# Patient Record
Sex: MALE | Race: WHITE | Employment: UNEMPLOYED | URBAN - METROPOLITAN AREA
[De-identification: names, ages, dates, MRNs, and addresses within clinical notes are randomized per-mention and may not be internally consistent; named-entity substitution may affect disease eponyms.]

---

## 2021-10-31 ENCOUNTER — HOSPITAL ENCOUNTER (EMERGENCY)
Age: 21
Discharge: HOME | End: 2021-10-31
Attending: EMERGENCY MEDICINE
Payer: MEDICAID

## 2021-10-31 VITALS
DIASTOLIC BLOOD PRESSURE: 66 MMHG | HEART RATE: 101 BPM | TEMPERATURE: 97.1 F | HEIGHT: 67 IN | WEIGHT: 137 LBS | BODY MASS INDEX: 21.5 KG/M2 | SYSTOLIC BLOOD PRESSURE: 117 MMHG | OXYGEN SATURATION: 99 % | RESPIRATION RATE: 16 BRPM

## 2021-10-31 DIAGNOSIS — Z86.79 HISTORY OF WOLFF-PARKINSON-WHITE (WPW) SYNDROME: ICD-10-CM

## 2021-10-31 DIAGNOSIS — R00.0 RACING HEART BEAT: Primary | ICD-10-CM

## 2021-10-31 PROCEDURE — 2580000003 HC RX 258: Performed by: EMERGENCY MEDICINE

## 2021-10-31 PROCEDURE — 93005 ELECTROCARDIOGRAM TRACING: CPT | Performed by: EMERGENCY MEDICINE

## 2021-10-31 PROCEDURE — 99283 EMERGENCY DEPT VISIT LOW MDM: CPT

## 2021-10-31 RX ORDER — HEPARIN SODIUM (PORCINE) LOCK FLUSH IV SOLN 100 UNIT/ML 100 UNIT/ML
100 SOLUTION INTRAVENOUS ONCE
Status: DISCONTINUED | OUTPATIENT
Start: 2021-10-31 | End: 2021-10-31

## 2021-10-31 RX ORDER — SODIUM CHLORIDE 0.9 % (FLUSH) 0.9 %
5-40 SYRINGE (ML) INJECTION 2 TIMES DAILY
Status: DISCONTINUED | OUTPATIENT
Start: 2021-10-31 | End: 2021-10-31 | Stop reason: HOSPADM

## 2021-10-31 RX ADMIN — SODIUM CHLORIDE, PRESERVATIVE FREE 10 ML: 5 INJECTION INTRAVENOUS at 12:36

## 2021-10-31 ASSESSMENT — ENCOUNTER SYMPTOMS
WHEEZING: 0
EYE REDNESS: 0
ABDOMINAL PAIN: 0
EYE DISCHARGE: 0
BACK PAIN: 0
SHORTNESS OF BREATH: 1
SINUS PRESSURE: 0
COUGH: 0
NAUSEA: 0
DIARRHEA: 0
VOMITING: 0
SORE THROAT: 0
EYE PAIN: 0

## 2021-10-31 NOTE — ED PROVIDER NOTES
Racing heart, dyspnea, drinking [place]. Resident of [facility] last use of illicit METH 2 weeks ago; As a child 2 cardiac ablations for WPW, no recent medical care per se    The history is provided by the patient. Palpitations  Palpitations quality:  Regular  Onset quality:  Unable to specify  Timing:  Constant  Progression:  Unchanged  Chronicity:  Recurrent  Context: caffeine, illicit drugs and nicotine    Relieved by:  Nothing  Worsened by:  Caffeine  Ineffective treatments:  None tried  Associated symptoms: diaphoresis, numbness and shortness of breath    Associated symptoms: no back pain, no chest pain, no cough, no nausea, no vomiting and no weakness         Review of Systems   Constitutional: Positive for diaphoresis. Negative for chills and fever. HENT: Negative for ear pain, sinus pressure and sore throat. Eyes: Negative for pain, discharge and redness. Respiratory: Positive for shortness of breath. Negative for cough and wheezing. Cardiovascular: Positive for palpitations. Negative for chest pain. Gastrointestinal: Negative for abdominal pain, diarrhea, nausea and vomiting. Genitourinary: Negative for dysuria and frequency. Musculoskeletal: Negative for arthralgias and back pain. Skin: Negative for rash and wound. Neurological: Positive for numbness. Negative for weakness and headaches. Hematological: Negative for adenopathy. Psychiatric/Behavioral: Negative. All other systems reviewed and are negative. Physical Exam  Vitals and nursing note reviewed. Constitutional:       General: He is in acute distress. Appearance: He is well-developed. He is diaphoretic. HENT:      Head: Normocephalic and atraumatic. Right Ear: Tympanic membrane normal.      Left Ear: Tympanic membrane normal.   Eyes:      Pupils: Pupils are equal, round, and reactive to light. Cardiovascular:      Rate and Rhythm: Regular rhythm. Tachycardia present.       Heart sounds: Normal heart sounds. No murmur heard. Pulmonary:      Effort: Pulmonary effort is normal.      Breath sounds: Normal breath sounds. Abdominal:      General: Bowel sounds are normal.      Palpations: Abdomen is soft. Tenderness: There is no abdominal tenderness. There is no guarding or rebound. Musculoskeletal:      Cervical back: Normal range of motion and neck supple. Skin:     General: Skin is warm. Neurological:      Mental Status: He is alert and oriented to person, place, and time. Psychiatric:         Behavior: Behavior normal.         Thought Content: Thought content normal.         Judgment: Judgment normal.        --------------------------------------------- PAST HISTORY ---------------------------------------------  Past Medical History:  has a past medical history of Methamphetamine abuse ([facility] [address] and Jenaro-Parkinson-White syndrome. Past Surgical History:  has a past surgical history that includes ablation of dysrhythmic focus. Social History:  reports that he has quit smoking. His smoking use included e-cigarettes. He has never used smokeless tobacco. He reports previous alcohol use. He reports current drug use. Drug: Methamphetamines. Family History: family history is not on file. The patients home medications have been reviewed. Allergies: Patient has no known allergies.     -------------------------------------------------- RESULTS -------------------------------------------------  Results for orders placed or performed during the hospital encounter of 10/31/21   EKG 12 Lead   Result Value Ref Range    Ventricular Rate 109 BPM    Atrial Rate 109 BPM    P-R Interval 172 ms    QRS Duration 86 ms    Q-T Interval 326 ms    QTc Calculation (Bazett) 439 ms    P Axis 63 degrees    R Axis 91 degrees    T Axis 53 degrees     No orders to display       ------------------------- NURSING NOTES AND VITALS REVIEWED ---------------------------   The nursing notes within the ED encounter and vital signs as below have been reviewed. /73   Pulse 120   Temp 97.1 °F (36.2 °C)   Resp 16   Ht 5' 7\" (1.702 m)   Wt 137 lb (62.1 kg)   SpO2 99%   BMI 21.46 kg/m²   Oxygen Saturation Interpretation: Normal      ------------------------------------------ PROGRESS NOTES ------------------------------------------   I have spoken with the patient and discussed todays results, in addition to providing specific details for the plan of care and counseling regarding the diagnosis and prognosis. Their questions are answered at this time and they are agreeable with the plan to transfer to a higher level of ED care. Case discussed with DR Meme Mariee, the [facility] [place] diversion due to generator issue; I then discussed the case with ED Attending [facility] Dr. Jeanne Shaw who is accepting this patient by EMS for further eval and treatment.      --------------------------------- ADDITIONAL PROVIDER NOTES ---------------------------------        This patient is stable for EMS Transfer. IMPRESSION:     1. Racing heart beat    2. History of Jenaro-Parkinson-White (WPW) syndrome      Patient's Medications   New Prescriptions    No medications on file   Previous Medications    ARIPIPRAZOLE (ABILIFY PO)    Take by mouth   Modified Medications    No medications on file   Discontinued Medications    No medications on file     EKG: This EKG is signed and interpreted by me.     Rate: 110  Rhythm: Sinus  Interpretation: non-specific EKG  Comparison: no previous EKG available      Procedures     EMA Arceo DO  10/31/21 9323

## 2021-11-01 LAB
EKG ATRIAL RATE: 109 BPM
EKG P AXIS: 63 DEGREES
EKG P-R INTERVAL: 172 MS
EKG Q-T INTERVAL: 326 MS
EKG QRS DURATION: 86 MS
EKG QTC CALCULATION (BAZETT): 439 MS
EKG R AXIS: 91 DEGREES
EKG T AXIS: 53 DEGREES
EKG VENTRICULAR RATE: 109 BPM

## 2021-11-01 PROCEDURE — 93010 ELECTROCARDIOGRAM REPORT: CPT | Performed by: INTERNAL MEDICINE

## 2021-12-27 ENCOUNTER — HOSPITAL ENCOUNTER (OUTPATIENT)
Age: 21
Discharge: HOME | End: 2021-12-27
Payer: MEDICAID

## 2021-12-27 LAB
EKG ATRIAL RATE: 77 BPM
EKG P AXIS: 56 DEGREES
EKG P-R INTERVAL: 206 MS
EKG Q-T INTERVAL: 350 MS
EKG QRS DURATION: 92 MS
EKG QTC CALCULATION (BAZETT): 396 MS
EKG R AXIS: 94 DEGREES
EKG T AXIS: 70 DEGREES
EKG VENTRICULAR RATE: 77 BPM

## 2021-12-27 PROCEDURE — 93005 ELECTROCARDIOGRAM TRACING: CPT | Performed by: NURSE PRACTITIONER

## 2022-01-05 ENCOUNTER — HOSPITAL ENCOUNTER (EMERGENCY)
Age: 22
Discharge: HOME | End: 2022-01-05
Attending: EMERGENCY MEDICINE
Payer: MEDICAID

## 2022-01-05 VITALS
DIASTOLIC BLOOD PRESSURE: 76 MMHG | HEART RATE: 64 BPM | BODY MASS INDEX: 21.92 KG/M2 | WEIGHT: 148 LBS | TEMPERATURE: 97.8 F | RESPIRATION RATE: 16 BRPM | HEIGHT: 69 IN | OXYGEN SATURATION: 98 % | SYSTOLIC BLOOD PRESSURE: 117 MMHG

## 2022-01-05 DIAGNOSIS — Z20.822 SUSPECTED COVID-19 VIRUS INFECTION: Primary | ICD-10-CM

## 2022-01-05 PROCEDURE — U0005 INFEC AGEN DETEC AMPLI PROBE: HCPCS

## 2022-01-05 PROCEDURE — U0003 INFECTIOUS AGENT DETECTION BY NUCLEIC ACID (DNA OR RNA); SEVERE ACUTE RESPIRATORY SYNDROME CORONAVIRUS 2 (SARS-COV-2) (CORONAVIRUS DISEASE [COVID-19]), AMPLIFIED PROBE TECHNIQUE, MAKING USE OF HIGH THROUGHPUT TECHNOLOGIES AS DESCRIBED BY CMS-2020-01-R: HCPCS

## 2022-01-05 PROCEDURE — 99281 EMR DPT VST MAYX REQ PHY/QHP: CPT

## 2022-01-05 RX ORDER — ONDANSETRON 4 MG/1
4 TABLET, ORALLY DISINTEGRATING ORAL EVERY 8 HOURS PRN
Qty: 10 TABLET | Refills: 0 | Status: SHIPPED | OUTPATIENT
Start: 2022-01-05 | End: 2022-03-31

## 2022-01-05 RX ORDER — BROMPHENIRAMINE MALEATE, PSEUDOEPHEDRINE HYDROCHLORIDE, AND DEXTROMETHORPHAN HYDROBROMIDE 2; 30; 10 MG/5ML; MG/5ML; MG/5ML
5 SYRUP ORAL 4 TIMES DAILY PRN
Qty: 120 ML | Refills: 0 | Status: SHIPPED | OUTPATIENT
Start: 2022-01-05 | End: 2022-03-31

## 2022-01-05 RX ORDER — HYDROXYZINE PAMOATE 25 MG/1
25 CAPSULE ORAL 3 TIMES DAILY PRN
COMMUNITY

## 2022-01-05 RX ORDER — BENZTROPINE MESYLATE 1 MG/1
1 TABLET ORAL 2 TIMES DAILY
COMMUNITY

## 2022-01-05 ASSESSMENT — ENCOUNTER SYMPTOMS
BACK PAIN: 0
DIARRHEA: 1
RHINORRHEA: 1
SORE THROAT: 0
EYE DISCHARGE: 0
NAUSEA: 1
SHORTNESS OF BREATH: 0
COUGH: 0
EYE PAIN: 0
SINUS PRESSURE: 0
ABDOMINAL PAIN: 0
WHEEZING: 0
VOMITING: 0
EYE REDNESS: 0

## 2022-01-05 NOTE — ED PROVIDER NOTES
PATIENT STATES HE HAD COVID IN October 2021 OR EARLIER    The history is provided by the patient. Illness   The current episode started today. The onset was sudden. The problem is moderate. Associated symptoms include diarrhea, nausea, congestion, headaches, rhinorrhea and muscle aches. Pertinent negatives include no fever, no abdominal pain, no vomiting, no ear pain, no sore throat, no cough, no wheezing, no rash, no eye discharge, no eye pain and no eye redness. Review of Systems   Constitutional: Negative for chills and fever. HENT: Positive for congestion and rhinorrhea. Negative for ear pain, sinus pressure and sore throat. Eyes: Negative for pain, discharge and redness. Respiratory: Negative for cough, shortness of breath and wheezing. Cardiovascular: Negative for chest pain. Gastrointestinal: Positive for diarrhea and nausea. Negative for abdominal pain and vomiting. Genitourinary: Negative for dysuria and frequency. Musculoskeletal: Negative for arthralgias and back pain. Skin: Negative for rash and wound. Neurological: Positive for headaches. Negative for weakness. Hematological: Negative for adenopathy. All other systems reviewed and are negative. Physical Exam  Vitals and nursing note reviewed. Constitutional:       Appearance: He is well-developed. HENT:      Head: Normocephalic and atraumatic. Jaw: No trismus. Right Ear: Hearing and external ear normal. Tympanic membrane is retracted. Left Ear: Hearing and external ear normal. Tympanic membrane is retracted. Nose: Mucosal edema and congestion present. Right Sinus: No maxillary sinus tenderness or frontal sinus tenderness. Left Sinus: No maxillary sinus tenderness or frontal sinus tenderness. Mouth/Throat:      Pharynx: Uvula midline. No uvula swelling.    Eyes:      General: Lids are normal.      Conjunctiva/sclera: Conjunctivae normal.      Pupils: Pupils are equal, round, and reactive to light. Cardiovascular:      Rate and Rhythm: Normal rate and regular rhythm. Heart sounds: Normal heart sounds. No murmur heard. Pulmonary:      Effort: Pulmonary effort is normal. No respiratory distress. Breath sounds: Normal breath sounds. No wheezing or rales. Abdominal:      General: Bowel sounds are normal.      Palpations: Abdomen is soft. Abdomen is not rigid. Tenderness: There is no abdominal tenderness. There is no guarding or rebound. Musculoskeletal:      Cervical back: Normal range of motion and neck supple. Skin:     General: Skin is warm and dry. Findings: No abrasion or rash. Neurological:      Mental Status: He is alert and oriented to person, place, and time. GCS: GCS eye subscore is 4. GCS verbal subscore is 5. GCS motor subscore is 6. Cranial Nerves: No cranial nerve deficit. Sensory: No sensory deficit. Coordination: Coordination normal.      Gait: Gait normal.          Procedures     MDM          --------------------------------------------- PAST HISTORY ---------------------------------------------  Past Medical History:  has a past medical history of Methamphetamine abuse ([facility] [address] and Jenaro-Parkinson-White syndrome. Past Surgical History:  has a past surgical history that includes ablation of dysrhythmic focus. Social History:  reports that he has quit smoking. His smoking use included e-cigarettes. He has never used smokeless tobacco. He reports previous alcohol use. He reports previous drug use. Drug: Methamphetamines (Crystal Meth). Family History: family history is not on file. The patients home medications have been reviewed. Allergies: Patient has no known allergies. -------------------------------------------------- RESULTS -------------------------------------------------  Labs:  No results found for this visit on 01/05/22.     Radiology:  No orders to display       ------------------------- NURSING NOTES AND VITALS REVIEWED ---------------------------  Date / Time Roomed:  1/5/2022  8:11 AM  ED Bed Assignment:  01/01    The nursing notes within the ED encounter and vital signs as below have been reviewed. /76   Pulse 64   Temp 97.8 °F (36.6 °C) (Temporal)   Resp 16   Ht 5' 9\" (1.753 m)   Wt 148 lb (67.1 kg)   SpO2 98%   BMI 21.86 kg/m²   Oxygen Saturation Interpretation: Normal      ------------------------------------------ PROGRESS NOTES ------------------------------------------  I have spoken with the patient and discussed todays results, in addition to providing specific details for the plan of care and counseling regarding the diagnosis and prognosis. Their questions are answered at this time and they are agreeable with the plan. I discussed at length with them reasons for immediate return here for re evaluation. They will followup with primary care by calling their office tomorrow. PCR COVID TEST DONE AS A SENDOUT      --------------------------------- ADDITIONAL PROVIDER NOTES ---------------------------------  At this time the patient is without objective evidence of an acute process requiring hospitalization or inpatient management. They have remained hemodynamically stable throughout their entire ED visit and are stable for discharge with outpatient follow-up. The plan has been discussed in detail and they are aware of the specific conditions for emergent return, as well as the importance of follow-up. New Prescriptions    BROMPHENIRAMINE-PSEUDOEPHEDRINE-DM 2-30-10 MG/5ML SYRUP    Take 5 mLs by mouth 4 times daily as needed for Congestion or Cough    ONDANSETRON (ZOFRAN ODT) 4 MG DISINTEGRATING TABLET    Take 1 tablet by mouth every 8 hours as needed for Nausea or Vomiting       Diagnosis:  1. Suspected COVID-19 virus infection        Disposition:  Patient's disposition: Discharge to home  Patient's condition is stable.                       Jeff Bowie, MD  01/05/22 9895

## 2022-01-06 LAB — SARS-COV-2, PCR: NOT DETECTED

## 2022-03-31 ENCOUNTER — HOSPITAL ENCOUNTER (EMERGENCY)
Age: 22
Discharge: HOME | End: 2022-03-31
Attending: EMERGENCY MEDICINE
Payer: MEDICAID

## 2022-03-31 VITALS
OXYGEN SATURATION: 99 % | HEART RATE: 96 BPM | HEIGHT: 69 IN | RESPIRATION RATE: 16 BRPM | BODY MASS INDEX: 22.22 KG/M2 | WEIGHT: 150 LBS | DIASTOLIC BLOOD PRESSURE: 80 MMHG | SYSTOLIC BLOOD PRESSURE: 114 MMHG | TEMPERATURE: 97.5 F

## 2022-03-31 DIAGNOSIS — K12.0 APHTHOUS ULCER OF TONGUE: ICD-10-CM

## 2022-03-31 DIAGNOSIS — J02.9 VIRAL PHARYNGITIS: Primary | ICD-10-CM

## 2022-03-31 DIAGNOSIS — B97.89 STOMATITIS, VIRAL: ICD-10-CM

## 2022-03-31 DIAGNOSIS — K12.1 STOMATITIS, VIRAL: ICD-10-CM

## 2022-03-31 LAB — STREP GRP A PCR: NEGATIVE

## 2022-03-31 PROCEDURE — 99283 EMERGENCY DEPT VISIT LOW MDM: CPT

## 2022-03-31 PROCEDURE — 87880 STREP A ASSAY W/OPTIC: CPT

## 2022-03-31 RX ORDER — ACYCLOVIR 400 MG/1
800 TABLET ORAL
Qty: 35 TABLET | Refills: 0 | Status: SHIPPED | OUTPATIENT
Start: 2022-03-31 | End: 2022-04-07

## 2022-03-31 RX ORDER — IBUPROFEN 800 MG/1
800 TABLET ORAL EVERY 8 HOURS PRN
Qty: 21 TABLET | Refills: 0 | Status: SHIPPED | OUTPATIENT
Start: 2022-03-31 | End: 2022-04-07

## 2022-03-31 RX ORDER — IBUPROFEN 600 MG/1
600 TABLET ORAL EVERY 6 HOURS PRN
COMMUNITY
End: 2022-03-31

## 2022-03-31 RX ORDER — BUPROPION HYDROCHLORIDE 150 MG/1
150 TABLET ORAL EVERY MORNING
COMMUNITY

## 2022-03-31 RX ORDER — TRIAMCINOLONE ACETONIDE 0.1 %
PASTE (GRAM) DENTAL
Qty: 5 G | Refills: 0 | Status: SHIPPED | OUTPATIENT
Start: 2022-03-31 | End: 2022-04-07

## 2022-03-31 ASSESSMENT — PAIN DESCRIPTION - DESCRIPTORS: DESCRIPTORS: BURNING

## 2022-03-31 ASSESSMENT — PAIN DESCRIPTION - LOCATION: LOCATION: MOUTH;THROAT

## 2022-03-31 ASSESSMENT — ENCOUNTER SYMPTOMS
EYE PAIN: 0
EYE REDNESS: 0
SHORTNESS OF BREATH: 0
NAUSEA: 0
ABDOMINAL PAIN: 0
VOMITING: 0
SORE THROAT: 1
WHEEZING: 0
SINUS PRESSURE: 0
COUGH: 0
EYE DISCHARGE: 0
BACK PAIN: 0
DIARRHEA: 0

## 2022-03-31 ASSESSMENT — PAIN DESCRIPTION - ONSET: ONSET: ON-GOING

## 2022-03-31 ASSESSMENT — PAIN SCALES - GENERAL: PAINLEVEL_OUTOF10: 8

## 2022-03-31 ASSESSMENT — PAIN DESCRIPTION - PROGRESSION: CLINICAL_PROGRESSION: NOT CHANGED

## 2022-03-31 ASSESSMENT — PAIN DESCRIPTION - PAIN TYPE: TYPE: ACUTE PAIN

## 2022-03-31 ASSESSMENT — PAIN DESCRIPTION - FREQUENCY: FREQUENCY: CONTINUOUS

## 2022-03-31 NOTE — ED PROVIDER NOTES
Blisters on tongue, throat sore to swallow    The history is provided by the patient. Pharyngitis  Location:  Generalized  Severity:  Mild  Onset quality:  Gradual  Timing:  Constant  Progression:  Worsening  Chronicity:  New  Relieved by:  Nothing  Worsened by:  Eating and swallowing  Associated symptoms: adenopathy    Associated symptoms: no abdominal pain, no chest pain, no chills, no cough, no ear pain, no eye discharge, no fever, no headaches, no rash and no shortness of breath         Review of Systems   Constitutional: Negative for chills and fever. HENT: Positive for sore throat. Negative for ear pain and sinus pressure. Eyes: Negative for pain, discharge and redness. Respiratory: Negative for cough, shortness of breath and wheezing. Cardiovascular: Negative for chest pain. Gastrointestinal: Negative for abdominal pain, diarrhea, nausea and vomiting. Genitourinary: Negative for dysuria and frequency. Musculoskeletal: Negative for arthralgias and back pain. Skin: Negative for rash and wound. Neurological: Negative for weakness and headaches. Hematological: Positive for adenopathy. Psychiatric/Behavioral: Negative. All other systems reviewed and are negative. Physical Exam  Vitals and nursing note reviewed. Constitutional:       Appearance: He is well-developed. HENT:      Head: Normocephalic and atraumatic. Mouth/Throat:      Mouth: Oral lesions present. Dentition: Gingival swelling present. Tongue: Lesions present. Palate: Lesions present. Pharynx: Uvula midline. Pharyngeal swelling and posterior oropharyngeal erythema present. Tonsils: No tonsillar exudate or tonsillar abscesses. Eyes:      Pupils: Pupils are equal, round, and reactive to light. Cardiovascular:      Rate and Rhythm: Normal rate and regular rhythm. Heart sounds: Normal heart sounds. No murmur heard.       Pulmonary:      Effort: Pulmonary effort is normal. Breath sounds: Normal breath sounds. Abdominal:      General: Bowel sounds are normal.      Palpations: Abdomen is soft. Tenderness: There is no abdominal tenderness. There is no guarding or rebound. Musculoskeletal:      Cervical back: Normal range of motion and neck supple. Skin:     General: Skin is warm and dry. Neurological:      Mental Status: He is alert and oriented to person, place, and time. Psychiatric:         Behavior: Behavior normal.         Thought Content: Thought content normal.         Judgment: Judgment normal.        --------------------------------------------- PAST HISTORY ---------------------------------------------  Past Medical History:  has a past medical history of Methamphetamine abuse ([facility] [address] and Jenaro-Parkinson-White syndrome. Past Surgical History:  has a past surgical history that includes ablation of dysrhythmic focus. Social History:  reports that he has quit smoking. His smoking use included e-cigarettes. He has never used smokeless tobacco. He reports previous alcohol use. He reports previous drug use. Drug: Methamphetamines (Crystal Meth). Family History: family history is not on file. The patients home medications have been reviewed. Allergies: Benadryl [diphenhydramine]    -------------------------------------------------- RESULTS -------------------------------------------------  Results for orders placed or performed during the hospital encounter of 03/31/22   Strep Screen Group A Throat    Specimen: Throat   Result Value Ref Range    Strep Grp A PCR Negative Negative     No orders to display       ------------------------- NURSING NOTES AND VITALS REVIEWED ---------------------------   The nursing notes within the ED encounter and vital signs as below have been reviewed.    /80   Pulse 96   Temp 97.5 °F (36.4 °C) (Temporal)   Resp 16   Ht 5' 9\" (1.753 m)   Wt 150 lb (68 kg)   SpO2 99%   BMI 22.15 kg/m²   Oxygen Saturation Interpretation: Normal      ------------------------------------------ PROGRESS NOTES ------------------------------------------   I have spoken with the patient and discussed todays results, in addition to providing specific details for the plan of care and counseling regarding the diagnosis and prognosis. Their questions are answered at this time and they are agreeable with the plan.      --------------------------------- ADDITIONAL PROVIDER NOTES ---------------------------------        This patient is stable for discharge. I have shared the specific conditions for return, as well as the importance of follow-up. IMPRESSION:     1. Viral pharyngitis    2. Stomatitis, viral    3. Aphthous ulcer of tongue      Patient's Medications   New Prescriptions    ACYCLOVIR (ZOVIRAX) 400 MG TABLET    Take 2 tablets by mouth 5 times daily for 7 days    IBUPROFEN (IBU) 800 MG TABLET    Take 1 tablet by mouth every 8 hours as needed for Pain    TRIAMCINOLONE ACETONIDE (KENALOG) 0.1 % PASTE    Apply to lesions 2 times daily.    Previous Medications    ARIPIPRAZOLE (ABILIFY PO)    Take by mouth    BENZTROPINE (COGENTIN) 1 MG TABLET    Take 1 mg by mouth 2 times daily    BUPROPION (WELLBUTRIN XL) 150 MG EXTENDED RELEASE TABLET    Take 150 mg by mouth every morning    HYDROXYZINE (VISTARIL) 25 MG CAPSULE    Take 25 mg by mouth 3 times daily as needed for Itching    UNKNOWN TO PATIENT    1 capsule daily   Modified Medications    No medications on file   Discontinued Medications    BROMPHENIRAMINE-PSEUDOEPHEDRINE-DM 2-30-10 MG/5ML SYRUP    Take 5 mLs by mouth 4 times daily as needed for Congestion or Cough    IBUPROFEN (ADVIL;MOTRIN) 600 MG TABLET    Take 600 mg by mouth every 6 hours as needed for Pain    ONDANSETRON (ZOFRAN ODT) 4 MG DISINTEGRATING TABLET    Take 1 tablet by mouth every 8 hours as needed for Nausea or Vomiting         Procedures     MDM                   Fidel Grebe,   03/31/22 0243

## 2022-12-18 ENCOUNTER — APPOINTMENT (OUTPATIENT)
Dept: GENERAL RADIOLOGY | Age: 22
End: 2022-12-18
Payer: MEDICAID

## 2022-12-18 ENCOUNTER — HOSPITAL ENCOUNTER (EMERGENCY)
Age: 22
Discharge: HOME | End: 2022-12-18
Attending: STUDENT IN AN ORGANIZED HEALTH CARE EDUCATION/TRAINING PROGRAM
Payer: MEDICAID

## 2022-12-18 VITALS
DIASTOLIC BLOOD PRESSURE: 68 MMHG | OXYGEN SATURATION: 99 % | HEIGHT: 69 IN | RESPIRATION RATE: 16 BRPM | BODY MASS INDEX: 25.18 KG/M2 | HEART RATE: 98 BPM | TEMPERATURE: 97.5 F | SYSTOLIC BLOOD PRESSURE: 128 MMHG | WEIGHT: 170 LBS

## 2022-12-18 DIAGNOSIS — J06.9 ACUTE UPPER RESPIRATORY INFECTION: ICD-10-CM

## 2022-12-18 DIAGNOSIS — R05.2 SUBACUTE COUGH: Primary | ICD-10-CM

## 2022-12-18 PROCEDURE — 71046 X-RAY EXAM CHEST 2 VIEWS: CPT

## 2022-12-18 PROCEDURE — 99283 EMERGENCY DEPT VISIT LOW MDM: CPT

## 2022-12-18 RX ORDER — BUSPIRONE HYDROCHLORIDE 15 MG/1
15 TABLET ORAL 3 TIMES DAILY
COMMUNITY

## 2022-12-18 RX ORDER — CLONIDINE HYDROCHLORIDE 0.1 MG/1
0.1 TABLET ORAL DAILY
COMMUNITY

## 2022-12-18 RX ORDER — OLANZAPINE 10 MG/1
10 TABLET ORAL NIGHTLY
COMMUNITY

## 2022-12-18 RX ORDER — BENZONATATE 100 MG/1
100 CAPSULE ORAL 3 TIMES DAILY PRN
Qty: 30 CAPSULE | Refills: 0 | Status: SHIPPED | OUTPATIENT
Start: 2022-12-18 | End: 2022-12-25

## 2022-12-18 ASSESSMENT — ENCOUNTER SYMPTOMS
ANAL BLEEDING: 0
DIARRHEA: 0
VOMITING: 0
RHINORRHEA: 0
ABDOMINAL DISTENTION: 0
CHEST TIGHTNESS: 0
SINUS PAIN: 0
CONSTIPATION: 0
EYE DISCHARGE: 0
BACK PAIN: 0
COUGH: 1
SHORTNESS OF BREATH: 0
SINUS PRESSURE: 0
NAUSEA: 0
ABDOMINAL PAIN: 0

## 2022-12-18 ASSESSMENT — PAIN DESCRIPTION - LOCATION: LOCATION: OTHER (COMMENT)

## 2022-12-18 ASSESSMENT — PAIN - FUNCTIONAL ASSESSMENT: PAIN_FUNCTIONAL_ASSESSMENT: 0-10

## 2022-12-18 ASSESSMENT — PAIN DESCRIPTION - DESCRIPTORS: DESCRIPTORS: SHARP

## 2022-12-18 ASSESSMENT — PAIN DESCRIPTION - PAIN TYPE: TYPE: ACUTE PAIN

## 2022-12-18 ASSESSMENT — PAIN DESCRIPTION - ONSET: ONSET: ON-GOING

## 2022-12-18 ASSESSMENT — PAIN DESCRIPTION - ORIENTATION: ORIENTATION: LEFT

## 2022-12-18 ASSESSMENT — PAIN SCALES - GENERAL: PAINLEVEL_OUTOF10: 4

## 2022-12-18 ASSESSMENT — PAIN DESCRIPTION - FREQUENCY: FREQUENCY: INTERMITTENT

## 2022-12-18 NOTE — ED PROVIDER NOTES
HPI     Patient is a 25 y.o. male presents with a chief complaint of cough  This has been occurring for a few weeks. Patient states it was gradual in onset. Presents with cough. Patient states that he has some sharp chest pain on the left side. Denies any fevers or chills. Patient denies any changes in urinary or bowel habits. Patient denies any use of IV drugs. Patient denies any recent surgery, recent travel, history of blood clots in the past.  Patient states that the only reason why he came in is because he looked up online that if he has \"black speckles\" and has cough he needs to come in. Patient is also having some mucus drainage. Review of Systems   Constitutional:  Negative for activity change, appetite change, fatigue and fever. HENT:  Negative for congestion, rhinorrhea, sinus pressure and sinus pain. Eyes:  Negative for discharge. Respiratory:  Positive for cough. Negative for chest tightness and shortness of breath. Cardiovascular:  Positive for chest pain. Negative for palpitations. Gastrointestinal:  Negative for abdominal distention, abdominal pain, anal bleeding, constipation, diarrhea, nausea and vomiting. Endocrine: Negative for polydipsia and polyuria. Genitourinary:  Negative for decreased urine volume, difficulty urinating, enuresis, flank pain, frequency and urgency. Musculoskeletal:  Negative for arthralgias, back pain and neck stiffness. Skin:  Negative for rash and wound. Neurological:  Negative for dizziness, weakness, light-headedness and headaches. Psychiatric/Behavioral:  Negative for agitation, behavioral problems and confusion. Physical Exam  Vitals and nursing note reviewed. Constitutional:       Appearance: He is well-developed. HENT:      Head: Normocephalic and atraumatic. Eyes:      Conjunctiva/sclera: Conjunctivae normal.   Cardiovascular:      Rate and Rhythm: Normal rate and regular rhythm.       Heart sounds: Normal heart sounds. No murmur heard. Comments: Reproducible chest pain. Pulmonary:      Effort: Pulmonary effort is normal. No respiratory distress. Breath sounds: Normal breath sounds. No wheezing or rales. Abdominal:      General: Bowel sounds are normal.      Palpations: Abdomen is soft. Tenderness: There is no abdominal tenderness. There is no guarding or rebound. Musculoskeletal:         General: No tenderness or deformity. Cervical back: Normal range of motion and neck supple. Skin:     General: Skin is warm and dry. Neurological:      Mental Status: He is alert and oriented to person, place, and time. Cranial Nerves: No cranial nerve deficit. Coordination: Coordination normal.        Procedures     MDM           Patient is a 25 y.o. male presenting with cough. Patient clinically appears well. Patient is PERC negative. Patient's chest pain is not consistent with ACS. Patient denies any IV drug use. Patient appears nontoxic. States that the symptoms have been going on for 2 weeks. Consistent with bronchitis. Patient does smoke. Patient had a chest x-ray shows no acute findings  Patient is PERC negative. No heart murmur. Patient be discharged home. Patient was given return precautions. Labs were interpreted by me. Patient will follow up with their primary care provider. Patient is agreeable to this plan. Patient has remained stable throughout their stay in the ED. This note was done using dictation software and there may be some grammatical errors associated with this. Rebecca Bowie MD              --------------------------------------------- PAST HISTORY ---------------------------------------------  Past Medical History:  has a past medical history of Methamphetamine abuse ([facility] [address] and Jenaro-Parkinson-White syndrome. Past Surgical History:  has a past surgical history that includes ablation of dysrhythmic focus.     Social History:  reports that he has quit smoking. His smoking use included e-cigarettes. He has never used smokeless tobacco. He reports that he does not currently use alcohol. He reports that he does not currently use drugs after having used the following drugs: Methamphetamines (Crystal Meth). Family History: family history is not on file. The patients home medications have been reviewed. Allergies: Benadryl [diphenhydramine]    -------------------------------------------------- RESULTS -------------------------------------------------  Labs:  No results found for this visit on 12/18/22. Radiology:  XR CHEST (2 VW)   Final Result   Hyperinflated lungs. Otherwise, no acute cardiopulmonary disease.             ------------------------- NURSING NOTES AND VITALS REVIEWED ---------------------------  Date / Time Roomed:  12/18/2022  4:10 PM  ED Bed Assignment:  03/03    The nursing notes within the ED encounter and vital signs as below have been reviewed. /68   Pulse 98   Temp 97.5 °F (36.4 °C) (Temporal)   Resp 16   Ht 5' 9\" (1.753 m)   Wt 170 lb (77.1 kg)   SpO2 99%   BMI 25.10 kg/m²   Oxygen Saturation Interpretation: Normal      ------------------------------------------ PROGRESS NOTES ------------------------------------------  5:10 PM EST  I have spoken with the patient and family if present and discussed todays results, in addition to providing specific details for the plan of care and counseling regarding the diagnosis and prognosis. Their questions are answered at this time and they are agreeable with the plan. I discussed at length with them reasons for immediate return here for re evaluation. They will followup with their primary care provider by calling their office as soon as possible.      --------------------------------- ADDITIONAL PROVIDER NOTES ---------------------------------  At this time the patient is without objective evidence of an acute process requiring hospitalization or inpatient management.   They have remained hemodynamically stable throughout their entire ED visit and are stable for discharge with outpatient follow-up. The plan has been discussed in detail and they are aware of the specific conditions for emergent return, as well as the importance of follow-up. New Prescriptions    BENZONATATE (TESSALON) 100 MG CAPSULE    Take 1 capsule by mouth 3 times daily as needed for Cough       Diagnosis:  1. Subacute cough    2. Acute upper respiratory infection        Disposition:  Patient's disposition: Discharge to home  Patient's condition is stable.        Clara Sanabria MD  Resident  12/18/22 3450

## 2024-07-04 ENCOUNTER — HOSPITAL ENCOUNTER (INPATIENT)
Age: 24
LOS: 4 days | Discharge: HOME | DRG: 753 | End: 2024-07-09
Attending: EMERGENCY MEDICINE | Admitting: PSYCHIATRY & NEUROLOGY
Payer: MEDICAID

## 2024-07-04 DIAGNOSIS — R45.851 SUICIDAL IDEATION: Primary | ICD-10-CM

## 2024-07-04 LAB
ALBUMIN SERPL-MCNC: 4.4 G/DL (ref 3.5–5.2)
ALP SERPL-CCNC: 101 U/L (ref 40–129)
ALT SERPL-CCNC: 32 U/L (ref 0–40)
AMPHET UR QL SCN: NEGATIVE
ANION GAP SERPL CALCULATED.3IONS-SCNC: 14 MMOL/L (ref 7–16)
APAP SERPL-MCNC: <5 UG/ML (ref 10–30)
AST SERPL-CCNC: 23 U/L (ref 0–39)
BARBITURATES UR QL SCN: NEGATIVE
BASOPHILS # BLD: 0.06 K/UL (ref 0–0.2)
BASOPHILS NFR BLD: 1 % (ref 0–2)
BENZODIAZ UR QL: NEGATIVE
BILIRUB SERPL-MCNC: 0.3 MG/DL (ref 0–1.2)
BUN SERPL-MCNC: 14 MG/DL (ref 6–20)
BUPRENORPHINE UR QL: NEGATIVE
CALCIUM SERPL-MCNC: 9.4 MG/DL (ref 8.6–10.2)
CANNABINOIDS UR QL SCN: NEGATIVE
CHLORIDE SERPL-SCNC: 104 MMOL/L (ref 98–107)
CO2 SERPL-SCNC: 22 MMOL/L (ref 22–29)
COCAINE UR QL SCN: NEGATIVE
CREAT SERPL-MCNC: 1.3 MG/DL (ref 0.7–1.2)
EOSINOPHIL # BLD: 0.15 K/UL (ref 0.05–0.5)
EOSINOPHILS RELATIVE PERCENT: 2 % (ref 0–6)
ERYTHROCYTE [DISTWIDTH] IN BLOOD BY AUTOMATED COUNT: 12.9 % (ref 11.5–15)
ETHANOLAMINE SERPL-MCNC: <10 MG/DL (ref 0–0.08)
FENTANYL UR QL: NEGATIVE
GFR, ESTIMATED: 78 ML/MIN/1.73M2
GLUCOSE SERPL-MCNC: 88 MG/DL (ref 74–99)
HCT VFR BLD AUTO: 46.1 % (ref 37–54)
HGB BLD-MCNC: 16.2 G/DL (ref 12.5–16.5)
IMM GRANULOCYTES # BLD AUTO: 0.03 K/UL (ref 0–0.58)
IMM GRANULOCYTES NFR BLD: 0 % (ref 0–5)
LYMPHOCYTES NFR BLD: 3.06 K/UL (ref 1.5–4)
LYMPHOCYTES RELATIVE PERCENT: 31 % (ref 20–42)
MCH RBC QN AUTO: 28.3 PG (ref 26–35)
MCHC RBC AUTO-ENTMCNC: 35.1 G/DL (ref 32–34.5)
MCV RBC AUTO: 80.5 FL (ref 80–99.9)
METHADONE UR QL: NEGATIVE
MONOCYTES NFR BLD: 1.17 K/UL (ref 0.1–0.95)
MONOCYTES NFR BLD: 12 % (ref 2–12)
NEUTROPHILS NFR BLD: 55 % (ref 43–80)
NEUTS SEG NFR BLD: 5.39 K/UL (ref 1.8–7.3)
OPIATES UR QL SCN: NEGATIVE
OXYCODONE UR QL SCN: NEGATIVE
PCP UR QL SCN: NEGATIVE
PLATELET # BLD AUTO: 384 K/UL (ref 130–450)
PMV BLD AUTO: 9.3 FL (ref 7–12)
POTASSIUM SERPL-SCNC: 4.1 MMOL/L (ref 3.5–5)
PROT SERPL-MCNC: 7.5 G/DL (ref 6.4–8.3)
RBC # BLD AUTO: 5.73 M/UL (ref 3.8–5.8)
SALICYLATES SERPL-MCNC: <0.3 MG/DL (ref 0–30)
SODIUM SERPL-SCNC: 140 MMOL/L (ref 132–146)
TEST INFORMATION: NORMAL
TOXIC TRICYCLIC SC,BLOOD: NEGATIVE
WBC OTHER # BLD: 9.9 K/UL (ref 4.5–11.5)

## 2024-07-04 PROCEDURE — 80143 DRUG ASSAY ACETAMINOPHEN: CPT

## 2024-07-04 PROCEDURE — G0480 DRUG TEST DEF 1-7 CLASSES: HCPCS

## 2024-07-04 PROCEDURE — 80179 DRUG ASSAY SALICYLATE: CPT

## 2024-07-04 PROCEDURE — 99285 EMERGENCY DEPT VISIT HI MDM: CPT

## 2024-07-04 PROCEDURE — 93005 ELECTROCARDIOGRAM TRACING: CPT | Performed by: EMERGENCY MEDICINE

## 2024-07-04 PROCEDURE — 80053 COMPREHEN METABOLIC PANEL: CPT

## 2024-07-04 PROCEDURE — 85025 COMPLETE CBC W/AUTO DIFF WBC: CPT

## 2024-07-04 PROCEDURE — 6370000000 HC RX 637 (ALT 250 FOR IP): Performed by: EMERGENCY MEDICINE

## 2024-07-04 PROCEDURE — 80307 DRUG TEST PRSMV CHEM ANLYZR: CPT

## 2024-07-04 RX ORDER — GABAPENTIN 300 MG/1
300 CAPSULE ORAL ONCE
Status: COMPLETED | OUTPATIENT
Start: 2024-07-04 | End: 2024-07-04

## 2024-07-04 RX ORDER — NICOTINE 21 MG/24HR
1 PATCH, TRANSDERMAL 24 HOURS TRANSDERMAL ONCE
Status: DISCONTINUED | OUTPATIENT
Start: 2024-07-04 | End: 2024-07-05

## 2024-07-04 RX ORDER — MECOBALAMIN 5000 MCG
5 TABLET,DISINTEGRATING ORAL NIGHTLY PRN
Status: DISCONTINUED | OUTPATIENT
Start: 2024-07-04 | End: 2024-07-09 | Stop reason: HOSPADM

## 2024-07-04 RX ORDER — GABAPENTIN 600 MG/1
300 TABLET ORAL ONCE
Status: DISCONTINUED | OUTPATIENT
Start: 2024-07-04 | End: 2024-07-04 | Stop reason: CLARIF

## 2024-07-04 RX ORDER — UREA 10 %
5 LOTION (ML) TOPICAL NIGHTLY PRN
Status: DISCONTINUED | OUTPATIENT
Start: 2024-07-04 | End: 2024-07-04 | Stop reason: CLARIF

## 2024-07-04 RX ORDER — CLONIDINE HYDROCHLORIDE 0.1 MG/1
0.1 TABLET ORAL ONCE
Status: COMPLETED | OUTPATIENT
Start: 2024-07-04 | End: 2024-07-04

## 2024-07-04 RX ADMIN — CLONIDINE HYDROCHLORIDE 0.1 MG: 0.1 TABLET ORAL at 20:36

## 2024-07-04 RX ADMIN — GABAPENTIN 300 MG: 300 CAPSULE ORAL at 20:54

## 2024-07-04 RX ADMIN — Medication 5 MG: at 20:54

## 2024-07-04 ASSESSMENT — PAIN - FUNCTIONAL ASSESSMENT: PAIN_FUNCTIONAL_ASSESSMENT: NONE - DENIES PAIN

## 2024-07-04 ASSESSMENT — LIFESTYLE VARIABLES
HOW OFTEN DO YOU HAVE A DRINK CONTAINING ALCOHOL: NEVER
HOW MANY STANDARD DRINKS CONTAINING ALCOHOL DO YOU HAVE ON A TYPICAL DAY: PATIENT DOES NOT DRINK

## 2024-07-05 PROBLEM — F31.64 BIPOLAR AFFECTIVE DISORDER, MIXED, SEVERE, WITH PSYCHOTIC BEHAVIOR (HCC): Status: ACTIVE | Noted: 2024-07-05

## 2024-07-05 PROBLEM — F31.9 BIPOLAR 1 DISORDER (HCC): Status: ACTIVE | Noted: 2024-07-05

## 2024-07-05 PROBLEM — F60.89 CLUSTER B PERSONALITY DISORDER (HCC): Status: ACTIVE | Noted: 2024-07-05

## 2024-07-05 PROBLEM — F31.9 BIPOLAR 1 DISORDER (HCC): Status: RESOLVED | Noted: 2024-07-05 | Resolved: 2024-07-05

## 2024-07-05 LAB
EKG ATRIAL RATE: 68 BPM
EKG P AXIS: 34 DEGREES
EKG P-R INTERVAL: 200 MS
EKG Q-T INTERVAL: 378 MS
EKG QRS DURATION: 94 MS
EKG QTC CALCULATION (BAZETT): 401 MS
EKG R AXIS: 85 DEGREES
EKG T AXIS: 68 DEGREES
EKG VENTRICULAR RATE: 68 BPM

## 2024-07-05 PROCEDURE — 93010 ELECTROCARDIOGRAM REPORT: CPT | Performed by: INTERNAL MEDICINE

## 2024-07-05 PROCEDURE — 90792 PSYCH DIAG EVAL W/MED SRVCS: CPT | Performed by: NURSE PRACTITIONER

## 2024-07-05 PROCEDURE — 6370000000 HC RX 637 (ALT 250 FOR IP): Performed by: NURSE PRACTITIONER

## 2024-07-05 PROCEDURE — 6370000000 HC RX 637 (ALT 250 FOR IP): Performed by: EMERGENCY MEDICINE

## 2024-07-05 PROCEDURE — 1240000000 HC EMOTIONAL WELLNESS R&B

## 2024-07-05 PROCEDURE — 6370000000 HC RX 637 (ALT 250 FOR IP): Performed by: PSYCHIATRY & NEUROLOGY

## 2024-07-05 RX ORDER — LANOLIN ALCOHOL/MO/W.PET/CERES
5 CREAM (GRAM) TOPICAL NIGHTLY PRN
COMMUNITY

## 2024-07-05 RX ORDER — HYDROXYZINE PAMOATE 50 MG/1
50 CAPSULE ORAL ONCE
Status: COMPLETED | OUTPATIENT
Start: 2024-07-05 | End: 2024-07-05

## 2024-07-05 RX ORDER — NICOTINE 21 MG/24HR
1 PATCH, TRANSDERMAL 24 HOURS TRANSDERMAL DAILY
Status: DISCONTINUED | OUTPATIENT
Start: 2024-07-05 | End: 2024-07-09 | Stop reason: HOSPADM

## 2024-07-05 RX ORDER — HALOPERIDOL 5 MG/ML
5 INJECTION INTRAMUSCULAR EVERY 6 HOURS PRN
Status: DISCONTINUED | OUTPATIENT
Start: 2024-07-05 | End: 2024-07-09 | Stop reason: HOSPADM

## 2024-07-05 RX ORDER — BUSPIRONE HYDROCHLORIDE 5 MG/1
5 TABLET ORAL 2 TIMES DAILY
Status: ON HOLD | COMMUNITY
End: 2024-07-09 | Stop reason: HOSPADM

## 2024-07-05 RX ORDER — ATOMOXETINE 60 MG/1
100 CAPSULE ORAL DAILY
Status: ON HOLD | COMMUNITY
End: 2024-07-09 | Stop reason: HOSPADM

## 2024-07-05 RX ORDER — CLONIDINE HYDROCHLORIDE 0.1 MG/1
0.1 TABLET ORAL DAILY
Status: DISCONTINUED | OUTPATIENT
Start: 2024-07-05 | End: 2024-07-09 | Stop reason: HOSPADM

## 2024-07-05 RX ORDER — GABAPENTIN 300 MG/1
300 CAPSULE ORAL 3 TIMES DAILY
COMMUNITY

## 2024-07-05 RX ORDER — LANOLIN ALCOHOL/MO/W.PET/CERES
3 CREAM (GRAM) TOPICAL NIGHTLY PRN
Status: DISCONTINUED | OUTPATIENT
Start: 2024-07-05 | End: 2024-07-09 | Stop reason: HOSPADM

## 2024-07-05 RX ORDER — GUANFACINE 1 MG/1
1 TABLET, EXTENDED RELEASE ORAL NIGHTLY
Status: ON HOLD | COMMUNITY
End: 2024-07-09 | Stop reason: HOSPADM

## 2024-07-05 RX ORDER — MAGNESIUM HYDROXIDE/ALUMINUM HYDROXICE/SIMETHICONE 120; 1200; 1200 MG/30ML; MG/30ML; MG/30ML
30 SUSPENSION ORAL PRN
Status: DISCONTINUED | OUTPATIENT
Start: 2024-07-05 | End: 2024-07-09 | Stop reason: HOSPADM

## 2024-07-05 RX ORDER — HYDROXYZINE PAMOATE 25 MG/1
50 CAPSULE ORAL 3 TIMES DAILY PRN
Status: DISCONTINUED | OUTPATIENT
Start: 2024-07-05 | End: 2024-07-09 | Stop reason: HOSPADM

## 2024-07-05 RX ORDER — OMEPRAZOLE 20 MG/1
40 CAPSULE, DELAYED RELEASE ORAL DAILY
COMMUNITY

## 2024-07-05 RX ORDER — PANTOPRAZOLE SODIUM 40 MG/1
40 TABLET, DELAYED RELEASE ORAL
Status: DISCONTINUED | OUTPATIENT
Start: 2024-07-06 | End: 2024-07-09 | Stop reason: HOSPADM

## 2024-07-05 RX ORDER — HALOPERIDOL 5 MG/1
5 TABLET ORAL EVERY 6 HOURS PRN
Status: DISCONTINUED | OUTPATIENT
Start: 2024-07-05 | End: 2024-07-09 | Stop reason: HOSPADM

## 2024-07-05 RX ORDER — NICOTINE 21 MG/24HR
1 PATCH, TRANSDERMAL 24 HOURS TRANSDERMAL DAILY
Status: DISCONTINUED | OUTPATIENT
Start: 2024-07-05 | End: 2024-07-05

## 2024-07-05 RX ORDER — GABAPENTIN 300 MG/1
300 CAPSULE ORAL 3 TIMES DAILY
Status: DISCONTINUED | OUTPATIENT
Start: 2024-07-05 | End: 2024-07-09 | Stop reason: HOSPADM

## 2024-07-05 RX ORDER — POLYETHYLENE GLYCOL 3350 17 G
2 POWDER IN PACKET (EA) ORAL
Status: DISCONTINUED | OUTPATIENT
Start: 2024-07-05 | End: 2024-07-05

## 2024-07-05 RX ORDER — ACETAMINOPHEN 325 MG/1
650 TABLET ORAL EVERY 6 HOURS PRN
Status: DISCONTINUED | OUTPATIENT
Start: 2024-07-05 | End: 2024-07-09 | Stop reason: HOSPADM

## 2024-07-05 RX ADMIN — CLONIDINE HYDROCHLORIDE 0.1 MG: 0.1 TABLET ORAL at 14:40

## 2024-07-05 RX ADMIN — HYDROXYZINE PAMOATE 50 MG: 50 CAPSULE ORAL at 01:11

## 2024-07-05 RX ADMIN — Medication 3 MG: at 21:24

## 2024-07-05 RX ADMIN — CARIPRAZINE 3 MG: 1.5 CAPSULE, GELATIN COATED ORAL at 16:44

## 2024-07-05 RX ADMIN — NICOTINE POLACRILEX 2 MG: 2 LOZENGE ORAL at 12:20

## 2024-07-05 RX ADMIN — GABAPENTIN 300 MG: 300 CAPSULE ORAL at 14:40

## 2024-07-05 RX ADMIN — GABAPENTIN 300 MG: 300 CAPSULE ORAL at 21:24

## 2024-07-05 ASSESSMENT — SLEEP AND FATIGUE QUESTIONNAIRES
DO YOU HAVE DIFFICULTY SLEEPING: YES
DO YOU USE A SLEEP AID: NO
AVERAGE NUMBER OF SLEEP HOURS: 8
SLEEP PATTERN: DIFFICULTY FALLING ASLEEP
DO YOU HAVE DIFFICULTY SLEEPING: YES
SLEEP PATTERN: DIFFICULTY FALLING ASLEEP
DO YOU USE A SLEEP AID: NO
AVERAGE NUMBER OF SLEEP HOURS: 8

## 2024-07-05 ASSESSMENT — PATIENT HEALTH QUESTIONNAIRE - PHQ9
1. LITTLE INTEREST OR PLEASURE IN DOING THINGS: NOT AT ALL
SUM OF ALL RESPONSES TO PHQ QUESTIONS 1-9: 2
SUM OF ALL RESPONSES TO PHQ QUESTIONS 1-9: 2
2. FEELING DOWN, DEPRESSED OR HOPELESS: MORE THAN HALF THE DAYS
SUM OF ALL RESPONSES TO PHQ9 QUESTIONS 1 & 2: 2
SUM OF ALL RESPONSES TO PHQ QUESTIONS 1-9: 2
SUM OF ALL RESPONSES TO PHQ QUESTIONS 1-9: 2

## 2024-07-05 ASSESSMENT — LIFESTYLE VARIABLES
HOW MANY STANDARD DRINKS CONTAINING ALCOHOL DO YOU HAVE ON A TYPICAL DAY: PATIENT DOES NOT DRINK
HOW OFTEN DO YOU HAVE A DRINK CONTAINING ALCOHOL: NEVER

## 2024-07-05 NOTE — ED NOTES
Behavioral Health Crisis Assessment      Chief Complaint: Patient concerned about worsening SI.    Mental Status Exam:Patient alert/oriented X3. Patient with flat affect, poor eye contact. Patient calm and cooperative with no sign of agitation. Patient speech clear, thought process organized, lucid and linear.   When asked about hallucinations, patient noted \"I see things in the corner of my eye.\" When asked what, \"random things.\" Patient denied having auditory hallucinations. Patient reported worsening SI over the last two months. Patient denied HI.    Legal Status:  [] Voluntary:  [x] Involuntary, Issued by:    Gender:  [x] Male [] Female [] Transgender  [] Other    Sexual Orientation:  [x] Heterosexual [] Homosexual [] Bisexual [] Other    Brief Clinical Summary:    Patient is a 24 year old male who has lived in sober living for a year, chart notes patient was dropped off by family. Per ED doctor note, patient reported worsening SI, a lot going on in his life. SW met with patient for assessment. SW asked patient what was going on in his life, noted to be \"a lot.\" Patient vague, responded that he is having \"a lot of mental problems.\" Patient admitted to having SI \"my whole life\" but it has worsened in the last two months. Patient reported SI has been causing sleep disruption. Patient denied current plan but stated he does not trust that he can be safe without intervention at this time. Patient noted having a history of self-injurious behavior, last cut \"3 years ago\" which patient noted was a suicide attempt. Patient stated he had another attempt at age 12, \"I put a cord around my neck.\" Patient denied having HI or history of aggression. Patient denied having access to firearms/weapons to SW but RN informed SW that patient talked about buying a gun and stated he had access to guns where he lived before sober living. Patient stated he is med compliant, active with [facility]. Patient initially denied being

## 2024-07-05 NOTE — H&P
Department of Psychiatry  History and Physical - Adult       Patient personally seen and examined by me and mental status exam performed.  I agree the below assessment by the medical student.  Psychomotor evaluation revealed no agitation retardation any abnormal movements.  His eye contact is fair his speech is normal rate rhythm and tone.  His mood is \"I feel okay.\"  Affect is mood incongruent flat blunted irritable .  His thought process is linear without flight of ideas loose associations.  Thought contents devoid of any auditory visual hallucinations delusions or any other perceptual abnormalities. He denies suicidal homicidal ideations intent or plan.  He is able to repeat words and phrases, his vocabulary is intact he has knowledge of current events and past events recent remote memory are intact   impulse control is adequate cognitive function peers to be his baseline his insight judgment is fair he is alert oriented time place and person        CHIEF COMPLAINT: \"I had suicidal thoughts my whole life\"    Patient was seen after discussing with the treatment team and reviewing the chart    CIRCUMSTANCES OF ADMISSION: Best Taylor is a 24 year old male with a past psychiatric history of anxiety, bipolar disorder, ADHD, past meth addiction, who presented to the ED with suicidal ideations. Patient was placed on an involuntary hold by ED physician. Precipitating factors include life stressors. Onset of acute symptoms, worsening for 2 weeks.     HISTORY OF PRESENT ILLNESS:      The patient is a 24 y.o. male who does not work, completed 10th grade, lives in Sober Living for a previous meth addiction, is single and has good social support, with significant past psychiatric history of anxiety, bipolar disorder, ADHD, past meth addiction. Patient presented with family to the ED for feeling suicidal. He was placed on an involuntary hold by ED physician. Urine drug screen was negative, Ethanol level was <10. QTC

## 2024-07-05 NOTE — DISCHARGE INSTRUCTIONS
Follow up for Tobacco Cessation at:    [facility]                                 Date:  Friday 7/12 at 10am              [address]    [place] [address]   (Inside [facility]    take B elevators to 7th floor)   Phone: [phone]   Fax: [phone]

## 2024-07-05 NOTE — PLAN OF CARE
Problem: Self Harm/Suicidality  Goal: Will have no self-injury during hospital stay  Description: INTERVENTIONS:  1.  Ensure constant observer at bedside with Q15M safety checks  2.  Maintain a safe environment  3.  Secure patient belongings  4.  Ensure family/visitors adhere to safety recommendations  5.  Ensure safety tray has been added to patient's diet order  6.  Every shift and PRN: Re-assess suicidal risk via Frequent Screener  Outcome: Progressing     Problem: Depression  Goal: Will be euthymic at discharge  Description: INTERVENTIONS:  1. Administer medication as ordered  2. Provide emotional support via 1:1 interaction with staff  3. Encourage involvement in milieu/groups/activities  4. Monitor for social isolation  Outcome: Progressing     Problem: Behavior  Goal: Pt/Family maintain appropriate behavior and adhere to behavioral management agreement, if implemented  Description: INTERVENTIONS:  1. Assess patient/family's coping skills and  non-compliant behavior (including use of illegal substances)  2. Notify security of behavior or suspected illegal substances which indicate the need for search of the family and/or belongings  3. Encourage verbalization of thoughts and concerns in a socially appropriate manner  4. Utilize positive, consistent limit setting strategies supporting safety of patient, staff and others  5. Encourage participation in the decision making process about the behavioral management agreement  6. If a visitor's behavior poses a threat to safety call refer to organization policy.  7. Initiate consult with , Psychosocial CNS, Spiritual Care as appropriate  Outcome: Progressing     Problem: Anxiety  Goal: Will report anxiety at manageable levels  Description: INTERVENTIONS:  1. Administer medication as ordered  2. Teach and rehearse alternative coping skills  3. Provide emotional support with 1:1 interaction with staff  Outcome: Progressing     Problem: Sleep

## 2024-07-05 NOTE — PLAN OF CARE
Denies suicidal ideations or thoughts of self harm.  Denies homicidal ideations or thoughts to hurt others.  Denies auditory and visual hallucinations.  Poor eye contact this AM, as day has progressed, patient noted to be out of his room and eye contact is good, flat affect.  Refused on unit groups today.  Medication compliant.  Patient was educated that he would not be prescribed his ADHD medication and verbalized understanding.  Up for meals.

## 2024-07-05 NOTE — ED PROVIDER NOTES
HPI:  7/4/24, Time: 10:06 PM EDT         Best Taylor is a 24 y.o. male presenting to the ED for suicidal ideation.  Patient states he has been suicidal for a while.  He does not have a plan.  He states getting worse.  Does have a psychiatrist.  He says is a lot going on his life.  No attempt.  Told his family and brought him in for evaluation.  No hallucinations.  No homicidal nation.  No other symptoms or complaints.    Review of Systems:   A complete review of systems was performed and pertinent positives and negatives are stated within HPI, all other systems reviewed and are negative.          --------------------------------------------- PAST HISTORY ---------------------------------------------  Past Medical History:  has a past medical history of Methamphetamine abuse (HCC) and Jenaro-Parkinson-White syndrome.    Past Surgical History:  has a past surgical history that includes ablation of dysrhythmic focus.    Social History:  reports that he has quit smoking. His smoking use included e-cigarettes. He has never used smokeless tobacco. He reports that he does not currently use alcohol. He reports that he does not currently use drugs after having used the following drugs: Methamphetamines (Crystal Meth).    Family History: family history is not on file.     The patient’s home medications have been reviewed.    Allergies: Benadryl [diphenhydramine]    -------------------------------------------------- RESULTS -------------------------------------------------  All laboratory and radiology results have been personally reviewed by myself   LABS:  Results for orders placed or performed during the hospital encounter of 07/04/24   CBC with Auto Differential   Result Value Ref Range    WBC 9.9 4.5 - 11.5 k/uL    RBC 5.73 3.80 - 5.80 m/uL    Hemoglobin 16.2 12.5 - 16.5 g/dL    Hematocrit 46.1 37.0 - 54.0 %    MCV 80.5 80.0 - 99.9 fL    MCH 28.3 26.0 - 35.0 pg    MCHC 35.1 (H) 32.0 - 34.5 g/dL    RDW 12.9 11.5

## 2024-07-06 LAB
CHOLEST SERPL-MCNC: 157 MG/DL
HBA1C MFR BLD: 5.6 % (ref 4–5.6)
HDLC SERPL-MCNC: 43 MG/DL
LDLC SERPL CALC-MCNC: 98 MG/DL
TRIGL SERPL-MCNC: 79 MG/DL
VLDLC SERPL CALC-MCNC: 16 MG/DL

## 2024-07-06 PROCEDURE — 1240000000 HC EMOTIONAL WELLNESS R&B

## 2024-07-06 PROCEDURE — 6370000000 HC RX 637 (ALT 250 FOR IP): Performed by: NURSE PRACTITIONER

## 2024-07-06 PROCEDURE — 83036 HEMOGLOBIN GLYCOSYLATED A1C: CPT

## 2024-07-06 PROCEDURE — 36415 COLL VENOUS BLD VENIPUNCTURE: CPT

## 2024-07-06 PROCEDURE — 99232 SBSQ HOSP IP/OBS MODERATE 35: CPT | Performed by: NURSE PRACTITIONER

## 2024-07-06 PROCEDURE — 80061 LIPID PANEL: CPT

## 2024-07-06 PROCEDURE — 6370000000 HC RX 637 (ALT 250 FOR IP): Performed by: PSYCHIATRY & NEUROLOGY

## 2024-07-06 RX ADMIN — CARIPRAZINE 3 MG: 1.5 CAPSULE, GELATIN COATED ORAL at 09:38

## 2024-07-06 RX ADMIN — PANTOPRAZOLE SODIUM 40 MG: 40 TABLET, DELAYED RELEASE ORAL at 06:58

## 2024-07-06 RX ADMIN — GABAPENTIN 300 MG: 300 CAPSULE ORAL at 09:38

## 2024-07-06 RX ADMIN — CLONIDINE HYDROCHLORIDE 0.1 MG: 0.1 TABLET ORAL at 09:38

## 2024-07-06 RX ADMIN — GABAPENTIN 300 MG: 300 CAPSULE ORAL at 21:00

## 2024-07-06 RX ADMIN — Medication 3 MG: at 21:00

## 2024-07-06 RX ADMIN — GABAPENTIN 300 MG: 300 CAPSULE ORAL at 14:20

## 2024-07-06 ASSESSMENT — PAIN SCALES - GENERAL
PAINLEVEL_OUTOF10: 0
PAINLEVEL_OUTOF10: 0

## 2024-07-06 NOTE — GROUP NOTE
Group Therapy Note    Date: 7/6/2024    Group Start Time: 0945  Group End Time: 1020  Group Topic: Psychoeducation    [facility]    Eliana Portillo CTRS    Group Therapy Note    Attendees: 17    Date: 7/6/2024  Start Time: 0945  End Time:  1020  Number of Participants: 17    Type of Group: Psychoeducation    Name:  Sleep Hygiene    Patient's Goal:  Identify how sleep affects mental health and ways to improve sleep.    Notes:  CTRS lead educational group discussion on sleep hygiene. Encouraged patients to share their experiences. Patient did not add to group discussion. Patient sat away from peers on outside of group perimeter.    Status After Intervention:  Unchanged    Participation Level: None    Participation Quality: Resistant      Speech:  None      Thought Process/Content: None observed      Affective Functioning: Congruent      Mood:  Flat      Level of consciousness:  Inattentive      Response to Learning: Resistant      Endings: None Reported    Modes of Intervention: Education, Support, Socialization, Exploration, Clarifying, and Problem-solving      Discipline Responsible: Psychoeducational Specialist      Signature:  MIGUEL ANGEL Beltran

## 2024-07-06 NOTE — PLAN OF CARE
Problem: Self Harm/Suicidality  Goal: Will have no self-injury during hospital stay  Description: INTERVENTIONS:  1.  Ensure constant observer at bedside with Q15M safety checks  2.  Maintain a safe environment  3.  Secure patient belongings  4.  Ensure family/visitors adhere to safety recommendations  5.  Ensure safety tray has been added to patient's diet order  6.  Every shift and PRN: Re-assess suicidal risk via Frequent Screener    7/6/2024 1429 by Nallely Leyva RN  Outcome: Progressing  7/6/2024 0543 by Stefani Chen RN  Outcome: Progressing     Problem: Depression  Goal: Will be euthymic at discharge  Description: INTERVENTIONS:  1. Administer medication as ordered  2. Provide emotional support via 1:1 interaction with staff  3. Encourage involvement in milieu/groups/activities  4. Monitor for social isolation  7/6/2024 1429 by Nallely Leyva RN  Outcome: Progressing  7/6/2024 0543 by Stefani Chen RN  Outcome: Progressing     Problem: Anxiety  Goal: Will report anxiety at manageable levels  Description: INTERVENTIONS:  1. Administer medication as ordered  2. Teach and rehearse alternative coping skills  3. Provide emotional support with 1:1 interaction with staff  7/6/2024 1429 by Nallely Leyva RN  Outcome: Progressing  7/6/2024 0543 by Stefani Chen RN  Outcome: Progressing      Patient has been pleasant and cooperative. Social with peers/ mood improving.  Denies suicidal and homicidal thoughts. Denies hallucinations.

## 2024-07-06 NOTE — GROUP NOTE
Group Therapy Note    Date: 7/6/2024    Group Start Time: 0930  Group End Time: 0945  Group Topic: Community Meeting    [facility]    Eliana Portillo CTRS    Group Therapy Note    Attendees: 18    Date: 7/6/2024  Start Time: 0930  End Time:  0945  Number of Participants: 18    Type of Group: Community Meeting    Was updated on expectations of the unit, staffing, and programming.  Patient shared goal for today as \"Try to stay awake.\"    Status After Intervention:  Improved    Participation Level: Active Listener and Interactive    Participation Quality: Appropriate, Attentive, and Sharing      Speech:  normal      Thought Process/Content: Logical  Linear      Affective Functioning: Congruent      Mood:  Appropriate      Level of consciousness:  Alert and Attentive      Response to Learning: Able to verbalize current knowledge/experience, Able to verbalize/acknowledge new learning, Able to retain information, Capable of insight, Able to change behavior, and Progressing to goal      Endings: None Reported    Modes of Intervention: Education, Support, Socialization, Exploration, Clarifying, and Problem-solving      Discipline Responsible: Psychoeducational Specialist      Signature:  MIGUEL ANGEL Beltran

## 2024-07-07 PROCEDURE — 99232 SBSQ HOSP IP/OBS MODERATE 35: CPT | Performed by: NURSE PRACTITIONER

## 2024-07-07 PROCEDURE — 1240000000 HC EMOTIONAL WELLNESS R&B

## 2024-07-07 PROCEDURE — 6370000000 HC RX 637 (ALT 250 FOR IP): Performed by: NURSE PRACTITIONER

## 2024-07-07 PROCEDURE — 6370000000 HC RX 637 (ALT 250 FOR IP): Performed by: EMERGENCY MEDICINE

## 2024-07-07 RX ADMIN — PANTOPRAZOLE SODIUM 40 MG: 40 TABLET, DELAYED RELEASE ORAL at 06:46

## 2024-07-07 RX ADMIN — GABAPENTIN 300 MG: 300 CAPSULE ORAL at 09:33

## 2024-07-07 RX ADMIN — GABAPENTIN 300 MG: 300 CAPSULE ORAL at 14:28

## 2024-07-07 RX ADMIN — CLONIDINE HYDROCHLORIDE 0.1 MG: 0.1 TABLET ORAL at 09:33

## 2024-07-07 RX ADMIN — Medication 5 MG: at 21:11

## 2024-07-07 RX ADMIN — CARIPRAZINE 3 MG: 1.5 CAPSULE, GELATIN COATED ORAL at 09:44

## 2024-07-07 RX ADMIN — GABAPENTIN 300 MG: 300 CAPSULE ORAL at 21:11

## 2024-07-07 ASSESSMENT — PAIN SCALES - GENERAL
PAINLEVEL_OUTOF10: 0
PAINLEVEL_OUTOF10: 0

## 2024-07-07 NOTE — PLAN OF CARE
Problem: Self Harm/Suicidality  Goal: Will have no self-injury during hospital stay  Description: INTERVENTIONS:  1.  Ensure constant observer at bedside with Q15M safety checks  2.  Maintain a safe environment  3.  Secure patient belongings  4.  Ensure family/visitors adhere to safety recommendations  5.  Ensure safety tray has been added to patient's diet order  6.  Every shift and PRN: Re-assess suicidal risk via Frequent Screener    7/7/2024 1421 by Nallely Leyva RN  Outcome: Progressing  7/7/2024 0517 by Stefani Chen RN  Outcome: Progressing     Problem: Depression  Goal: Will be euthymic at discharge  Description: INTERVENTIONS:  1. Administer medication as ordered  2. Provide emotional support via 1:1 interaction with staff  3. Encourage involvement in milieu/groups/activities  4. Monitor for social isolation  7/7/2024 1421 by Nallely Leyva RN  Outcome: Progressing  7/7/2024 0517 by Stefani Chen RN  Outcome: Progressing     Problem: Anxiety  Goal: Will report anxiety at manageable levels  Description: INTERVENTIONS:  1. Administer medication as ordered  2. Teach and rehearse alternative coping skills  3. Provide emotional support with 1:1 interaction with staff  7/7/2024 0517 by Stefani Chen RN  Outcome: Progressing         Pleasant and cooperative . Reports feeling better.  No voiced complaints. Denies suicidal and homicidal thoughts. Denies hallucinations.

## 2024-07-07 NOTE — PLAN OF CARE
1130pm to 730am shift note: Pt resting in bed apparently asleep with easy even respirations at HS q 15 min electronic rounding.

## 2024-07-07 NOTE — GROUP NOTE
Group Therapy Note    Date: 7/7/2024  Start Time: 0930  End Time:  0950  Number of Participants: 13    Type of Group: Community Meeting    Wellness Binder Information  Module Name:  Community Meeting      Patient's Goal:  Patient will be oriented to the unit including but not limited expectations, staff, programming schedule.  Patient will be able to ID expectations of treatment.     Notes: Patient was a participant in community meeting, and was updated on expectations of the unit, staffing, programming schedule, and acclimated to their environment.    Patient shared goal for today as \"work on anxiety\"    Status After Intervention:  Improved    Participation Level: Active Listener and Interactive    Participation Quality: Appropriate and Attentive      Speech:  normal      Thought Process/Content: Logical      Affective Functioning: Congruent      Mood: anxious      Level of consciousness:  Alert and Attentive      Response to Learning: Able to verbalize current knowledge/experience, Able to verbalize/acknowledge new learning, and Progressing to goal      Endings: None Reported    Modes of Intervention: Exploration, Clarifying, and Problem-solving      Discipline Responsible: Recreational Therapist      Signature:  MIGUEL ANGEL Harding

## 2024-07-07 NOTE — GROUP NOTE
Date: 7/7/2024  Start Time: 1530  End Time:  1630  Number of Participants: 13    Type of Group: Recreational    Wellness Binder Information  Module Name:  Applied Cavitation Game    Patient's Goal:  To increase socialization amongst peers.  To introduce potential new leisure interests.    Notes:  CTRS facilitated a game called the EBIQUOUS it Game.  Patient was an active participant in activity and exhibited a receptive behavior.  Patient was social amongst peers.     Status After Intervention:  Improved    Participation Level: Active Listener and Interactive    Participation Quality: Appropriate, Attentive, and Sharing      Speech:  normal      Thought Process/Content: Logical      Affective Functioning: Congruent      Mood: euthymic      Level of consciousness:  Alert and Attentive      Response to Learning: Able to verbalize current knowledge/experience, Able to verbalize/acknowledge new learning, Able to retain information, and Progressing to goal      Endings: None Reported    Modes of Intervention: Education, Socialization, Exploration, and Activity      Discipline Responsible: Recreational Therapist      Signature:  MIGUEL ANGEL Harding    Group Therapy Note    Attendees: 13

## 2024-07-07 NOTE — PLAN OF CARE
Problem: Risk for Elopement  Goal: Patient will not exit the unit/facility without proper excort  Outcome: Progressing     Problem: Self Harm/Suicidality  Goal: Will have no self-injury during hospital stay  Description: INTERVENTIONS:  1.  Ensure constant observer at bedside with Q15M safety checks  2.  Maintain a safe environment  3.  Secure patient belongings  4.  Ensure family/visitors adhere to safety recommendations  5.  Ensure safety tray has been added to patient's diet order  6.  Every shift and PRN: Re-assess suicidal risk via Frequent Screener    7/6/2024 2117 by Asya Pagan RN  Outcome: Progressing  7/6/2024 1429 by Nallely Leyva RN  Outcome: Progressing     Problem: Depression  Goal: Will be euthymic at discharge  Description: INTERVENTIONS:  1. Administer medication as ordered  2. Provide emotional support via 1:1 interaction with staff  3. Encourage involvement in milieu/groups/activities  4. Monitor for social isolation  7/6/2024 2117 by Asya Pagan RN  Outcome: Progressing  7/6/2024 1429 by Nallely Leyva RN  Outcome: Progressing     Problem: Anxiety  Goal: Will report anxiety at manageable levels  Description: INTERVENTIONS:  1. Administer medication as ordered  2. Teach and rehearse alternative coping skills  3. Provide emotional support with 1:1 interaction with staff  7/6/2024 1429 by Nallely Leyva RN  Outcome: Progressing       Pt has been out social with peers. He is calm and cooperative. He is taking meds and attending groups. Pt denied homicidal/suicidal thoughts/hallucinations.

## 2024-07-08 PROCEDURE — 6370000000 HC RX 637 (ALT 250 FOR IP): Performed by: EMERGENCY MEDICINE

## 2024-07-08 PROCEDURE — 6370000000 HC RX 637 (ALT 250 FOR IP): Performed by: NURSE PRACTITIONER

## 2024-07-08 PROCEDURE — 1240000000 HC EMOTIONAL WELLNESS R&B

## 2024-07-08 PROCEDURE — 99232 SBSQ HOSP IP/OBS MODERATE 35: CPT | Performed by: NURSE PRACTITIONER

## 2024-07-08 RX ADMIN — GABAPENTIN 300 MG: 300 CAPSULE ORAL at 14:11

## 2024-07-08 RX ADMIN — GABAPENTIN 300 MG: 300 CAPSULE ORAL at 21:24

## 2024-07-08 RX ADMIN — PANTOPRAZOLE SODIUM 40 MG: 40 TABLET, DELAYED RELEASE ORAL at 09:27

## 2024-07-08 RX ADMIN — Medication 5 MG: at 21:24

## 2024-07-08 RX ADMIN — GABAPENTIN 300 MG: 300 CAPSULE ORAL at 09:27

## 2024-07-08 RX ADMIN — CLONIDINE HYDROCHLORIDE 0.1 MG: 0.1 TABLET ORAL at 09:27

## 2024-07-08 RX ADMIN — CARIPRAZINE 3 MG: 1.5 CAPSULE, GELATIN COATED ORAL at 11:07

## 2024-07-08 ASSESSMENT — PAIN SCALES - GENERAL: PAINLEVEL_OUTOF10: 0

## 2024-07-08 NOTE — PLAN OF CARE
Problem: Risk for Elopement  Goal: Patient will not exit the unit/facility without proper excort  Outcome: Progressing     Problem: Self Harm/Suicidality  Goal: Will have no self-injury during hospital stay  Description: INTERVENTIONS:  1.  Ensure constant observer at bedside with Q15M safety checks  2.  Maintain a safe environment  3.  Secure patient belongings  4.  Ensure family/visitors adhere to safety recommendations  5.  Ensure safety tray has been added to patient's diet order  6.  Every shift and PRN: Re-assess suicidal risk via Frequent Screener    7/7/2024 2214 by Asya Pagan RN  Outcome: Progressing  7/7/2024 1421 by Nallely Leyva RN  Outcome: Progressing     Problem: Depression  Goal: Will be euthymic at discharge  Description: INTERVENTIONS:  1. Administer medication as ordered  2. Provide emotional support via 1:1 interaction with staff  3. Encourage involvement in milieu/groups/activities  4. Monitor for social isolation  7/7/2024 2214 by Asay Pagan RN  Outcome: Progressing  7/7/2024 1421 by Nallely Leyva RN  Outcome: Progressing     Problem: Behavior  Goal: Pt/Family maintain appropriate behavior and adhere to behavioral management agreement, if implemented  Description: INTERVENTIONS:  1. Assess patient/family's coping skills and  non-compliant behavior (including use of illegal substances)  2. Notify security of behavior or suspected illegal substances which indicate the need for search of the family and/or belongings  3. Encourage verbalization of thoughts and concerns in a socially appropriate manner  4. Utilize positive, consistent limit setting strategies supporting safety of patient, staff and others  5. Encourage participation in the decision making process about the behavioral management agreement  6. If a visitor's behavior poses a threat to safety call refer to organization policy.  7. Initiate consult with , Psychosocial CNS, Spiritual Care as

## 2024-07-08 NOTE — GROUP NOTE
Group Therapy Note    Date: 7/8/2024    Group Start Time: 0945  Group End Time: 1020  Group Topic: Psychoeducation    [facility]    Eliana Portillo CTRS    Group Therapy Note    Attendees: 19    Date: 7/8/2024  Start Time: 0945  End Time:  1025  Number of Participants: 19    Type of Group: Psychoeducation    Name:  Positive Thinking    Patient's Goal:  Identify what is positive thinking, types of negative thinking and ways to improve thinking habits.    Notes:  CTRS led educational group discussion on positive thinking. Encouraged patients to share their experiences. Patient was actively engaged in group discussion and made positive statements.    Status After Intervention:  Improved    Participation Level: Active Listener and Interactive    Participation Quality: Appropriate, Attentive, and Sharing      Speech:  normal      Thought Process/Content: Logical  Linear      Affective Functioning: Congruent      Mood:  Appropriate      Level of consciousness:  Alert and Attentive      Response to Learning: Able to verbalize current knowledge/experience, Able to verbalize/acknowledge new learning, Able to retain information, Capable of insight, Able to change behavior, and Progressing to goal      Endings: None Reported    Modes of Intervention: Education, Support, Socialization, Exploration, Clarifying, and Problem-solving      Discipline Responsible: Psychoeducational Specialist      Signature:  MIGUEL ANGEL Beltran

## 2024-07-08 NOTE — GROUP NOTE
Group Therapy Note    Date: 7/8/2024    Group Start Time: 0930  Group End Time: 0945  Group Topic: Community Meeting    [facility]    Eliana Portillo CTRS    Group Therapy Note    Attendees: 18    Date: 7/8/2024  Start Time: 0930  End Time:  0945  Number of Participants: 18    Type of Group: Community Meeting    Was updated on expectations of the unit, staffing, and programming.  Patient shared goal for today as \"Try to be more social.\"    Status After Intervention:  Improved    Participation Level: Active Listener and Interactive    Participation Quality: Appropriate, Attentive, and Sharing      Speech:  normal      Thought Process/Content: Logical  Linear      Affective Functioning: Congruent      Mood:  Appropriate      Level of consciousness:  Alert and Attentive      Response to Learning: Able to verbalize current knowledge/experience, Able to verbalize/acknowledge new learning, Able to retain information, Capable of insight, Able to change behavior, and Progressing to goal      Endings: None Reported    Modes of Intervention: Education, Support, Socialization, Exploration, Clarifying, and Problem-solving      Discipline Responsible: Psychoeducational Specialist      Signature:  MIGUEL ANGEL Beltran

## 2024-07-08 NOTE — GROUP NOTE
Group Therapy Note    Date: 7/8/2024    Group Start Time: 1430  Group End Time: 1505  Group Topic: Recreational    [facility]    Eliana Portillo CTRS    Group Therapy Note    Attendees: 13    Date: 7/8/2024  Start Time: 1430  End Time:  1505  Number of Participants: 13    Type of Group: Recreational    Name:  Trivia    Patient's Goal:  Increased awareness of summer facts. Encouraged patient socialization.     Notes:  CTRS facilitated [employer] game. Patient was actively engaged in group activity and answered [employer] questions appropriately.    Status After Intervention:  Improved    Participation Level: Active Listener and Interactive    Participation Quality: Appropriate, Attentive, Sharing, and Supportive      Speech:  normal      Thought Process/Content: Logical  Linear      Affective Functioning: Congruent      Mood:  Appropriate      Level of consciousness:  Alert and Attentive      Response to Learning: Able to verbalize current knowledge/experience, Able to verbalize/acknowledge new learning, Able to retain information, Capable of insight, and Progressing to goal      Endings: None Reported    Modes of Intervention: Socialization and Activity      Discipline Responsible: Psychoeducational Specialist      Signature:  MIGUEL ANGEL Beltran

## 2024-07-08 NOTE — PLAN OF CARE
Patient is alert and oriented x 4.  Denies pain.  No noted signs or symptoms of distress.   Denies SI/HI, A/V/H, or thoughts of self harm when asked.    Rates Anxiety at 3/10 and Depression at 3/10.    Attended on unit groups today,   Medication compliant.     Pleasant, polite, and cooperative.    Good eye contact.  Appropriate with peers and staff when out on unit.    Appears well groomed/neat, room Is clean.    Described his mood as \"I feel fine, I feel better\".  Up for all meals.    Will continue to monitor for safety q 15 minute safety rounds and environmental assessments.

## 2024-07-08 NOTE — PROGRESS NOTES
BEHAVIORAL HEALTH FOLLOW-UP NOTE     7/6/2024     Patient was seen and examined in person, Chart reviewed   Patient's case discussed with staff/team    Chief Complaint: \"I think the meds are helping.\"    Interim History:   I saw patient's morning in his room.  He tells me he slept okay last night.  He tells me that he thinks the medications are working.  He denies suicidal ideations intent or plan denies any auditory or visual hallucinations no behavioral disturbances noted per staff affect seems slightly brighter less irritable today on assessment    Appetite: [x] Normal/Unchanged  [] Increased  [] Decreased      Sleep:       [x] Normal/Unchanged  [] Fair       [] Poor              Energy:    [x] Normal/Unchanged  [] Increased  [] Decreased        SI [] Present  [x] Absent    HI  []Present  [x] Absent     Aggression:  [] yes  [x] no    Patient is [x] able  [] unable to CONTRACT FOR SAFETY     PAST MEDICAL/PSYCHIATRIC HISTORY:   Past Medical History:   Diagnosis Date    Methamphetamine abuse (HCC)     Jenaro-Parkinson-White syndrome        FAMILY/SOCIAL HISTORY:  History reviewed. No pertinent family history.  Social History     Socioeconomic History    Marital status: Single     Spouse name: Not on file    Number of children: Not on file    Years of education: Not on file    Highest education level: Not on file   Occupational History    Not on file   Tobacco Use    Smoking status: Former     Types: E-Cigarettes    Smokeless tobacco: Never   Substance and Sexual Activity    Alcohol use: Not Currently    Drug use: Not Currently     Types: Methamphetamines (Crystal Meth)    Sexual activity: Not on file   Other Topics Concern    Not on file   Social History Narrative    Not on file     Social Determinants of Health     Financial Resource Strain: Not on file   Food Insecurity: No Food Insecurity (7/5/2024)    Hunger Vital Sign     Worried About Running Out of Food in the Last Year: Never true     Ran Out of Food in the 
BEHAVIORAL HEALTH FOLLOW-UP NOTE     7/7/2024     Patient was seen and examined in person, Chart reviewed   Patient's case discussed with staff/team    Chief Complaint: \"I I am feeling better.\"    Interim History:   Patient seen this morning out the milieu he sitting out and socializing with peers affect seems brighter and more pleasant he is more relaxed.  He states he is feeling better with the medications.  He is eating well sleeping well and no neurovegetative signs of depression and no overt or covert side psychosis states he is sleeping good he states he feels \"a lot better.\"  He is appreciative that he is receiving here.  He has been out social with peers has been attending groups no behavioral disturbances medication compliant    Appetite: [x] Normal/Unchanged  [] Increased  [] Decreased      Sleep:       [x] Normal/Unchanged  [] Fair       [] Poor              Energy:    [x] Normal/Unchanged  [] Increased  [] Decreased        SI [] Present  [x] Absent    HI  []Present  [x] Absent     Aggression:  [] yes  [x] no    Patient is [x] able  [] unable to CONTRACT FOR SAFETY     PAST MEDICAL/PSYCHIATRIC HISTORY:   Past Medical History:   Diagnosis Date    Methamphetamine abuse (HCC)     Jenaro-Parkinson-White syndrome        FAMILY/SOCIAL HISTORY:  History reviewed. No pertinent family history.  Social History     Socioeconomic History    Marital status: Single     Spouse name: Not on file    Number of children: Not on file    Years of education: Not on file    Highest education level: Not on file   Occupational History    Not on file   Tobacco Use    Smoking status: Former     Types: E-Cigarettes    Smokeless tobacco: Never   Substance and Sexual Activity    Alcohol use: Not Currently    Drug use: Not Currently     Types: Methamphetamines (Crystal Meth)    Sexual activity: Not on file   Other Topics Concern    Not on file   Social History Narrative    Not on file     Social Determinants of Health     Financial 
Leisure assessment completed.  
Patient declined invitation to the following groups:    Community Meeting  Education  Spiritual Care    Patient will continue to be provided with opportunities to enhance leisure skills/interests and/or coping mechanisms.  
Pt resting in bed apparently asleep with easy even respirations at HS q 15 min electronic rounding.    
Smokeless tobacco: Never   Substance and Sexual Activity    Alcohol use: Not Currently    Drug use: Not Currently     Types: Methamphetamines (Crystal Meth)    Sexual activity: Not on file   Other Topics Concern    Not on file   Social History Narrative    Not on file     Social Determinants of Health     Financial Resource Strain: Not on file   Food Insecurity: No Food Insecurity (7/5/2024)    Hunger Vital Sign     Worried About Running Out of Food in the Last Year: Never true     Ran Out of Food in the Last Year: Never true   Transportation Needs: No Transportation Needs (7/5/2024)    PRAPARE - Transportation     Lack of Transportation (Medical): No     Lack of Transportation (Non-Medical): No   Physical Activity: Not on file   Stress: Not on file   Social Connections: Somewhat Isolated (7/8/2024)    Social Connections ([facility] HRSN)     If for any reason you need help with day-to-day activities such as bathing, preparing meals, shopping, managing finances, etc., do you get the help you need?: Not on file   Intimate Partner Violence: Not on file   Housing Stability: Low Risk  (7/5/2024)    Housing Stability Vital Sign     Unable to Pay for Housing in the Last Year: No     Number of Places Lived in the Last Year: 1     Unstable Housing in the Last Year: No           ROS:  [x] All negative/unchanged except if checked. Explain positive(checked items) below:  [] Constitutional  [] Eyes  [] Ear/Nose/Mouth/Throat  [] Respiratory  [] CV  [] GI  []   [] Musculoskeletal  [] Skin/Breast  [] Neurological  [] Endocrine  [] Heme/Lymph  [] Allergic/Immunologic    Explanation:     MEDICATIONS:    Current Facility-Administered Medications:     acetaminophen (TYLENOL) tablet 650 mg, 650 mg, Oral, Q6H PRN, Wang Amaral MD    magnesium hydroxide (MILK OF MAGNESIA) 400 MG/5ML suspension 30 mL, 30 mL, Oral, Daily PRN, Wang Amaral MD    aluminum & magnesium hydroxide-simethicone (MAALOX) 200-200-20 MG/5ML suspension 30 mL,

## 2024-07-08 NOTE — GROUP NOTE
Group Therapy Note    Date: 7/8/2024    Group Start Time: 1110  Group End Time: 1155  Group Topic: Psychotherapy    [facility]    Roldan Deng MSW, LSW        Group Therapy Note    Attendees: 12       Patient's Goal:  To increase social interaction and improve relationships with others.      Notes:  Pt was attentive in group and was able to identify an agenda. They were also able to verbalize relating to others within the group.     Status After Intervention:  Improved    Participation Level: Active Listener and Interactive    Participation Quality: Appropriate, Attentive, and Sharing      Speech:  normal      Thought Process/Content: Logical      Affective Functioning: Congruent      Mood: euthymic      Level of consciousness:  Alert, Oriented x4, and Attentive      Response to Learning: Able to verbalize current knowledge/experience, Able to verbalize/acknowledge new learning, and Able to retain information      Endings: None Reported    Modes of Intervention: Education, Support, Socialization, Exploration, Clarifying, and Problem-solving      Discipline Responsible: /Counselor      Signature:  TEZ Cheng LSW

## 2024-07-09 VITALS
RESPIRATION RATE: 14 BRPM | HEART RATE: 60 BPM | SYSTOLIC BLOOD PRESSURE: 108 MMHG | WEIGHT: 161.2 LBS | DIASTOLIC BLOOD PRESSURE: 74 MMHG | BODY MASS INDEX: 23.88 KG/M2 | HEIGHT: 69 IN | OXYGEN SATURATION: 98 % | TEMPERATURE: 98.3 F

## 2024-07-09 PROCEDURE — 99239 HOSP IP/OBS DSCHRG MGMT >30: CPT | Performed by: NURSE PRACTITIONER

## 2024-07-09 PROCEDURE — 6370000000 HC RX 637 (ALT 250 FOR IP): Performed by: NURSE PRACTITIONER

## 2024-07-09 RX ORDER — NICOTINE 21 MG/24HR
1 PATCH, TRANSDERMAL 24 HOURS TRANSDERMAL DAILY
Qty: 30 PATCH | Refills: 3 | COMMUNITY
Start: 2024-07-10 | End: 2024-11-07

## 2024-07-09 RX ADMIN — CARIPRAZINE 3 MG: 1.5 CAPSULE, GELATIN COATED ORAL at 08:46

## 2024-07-09 RX ADMIN — GABAPENTIN 300 MG: 300 CAPSULE ORAL at 14:09

## 2024-07-09 RX ADMIN — PANTOPRAZOLE SODIUM 40 MG: 40 TABLET, DELAYED RELEASE ORAL at 06:45

## 2024-07-09 RX ADMIN — GABAPENTIN 300 MG: 300 CAPSULE ORAL at 08:46

## 2024-07-09 RX ADMIN — CLONIDINE HYDROCHLORIDE 0.1 MG: 0.1 TABLET ORAL at 08:47

## 2024-07-09 ASSESSMENT — PAIN SCALES - GENERAL: PAINLEVEL_OUTOF10: 0

## 2024-07-09 NOTE — CARE COORDINATION
Pt was seen during treatment team. Pt reports feeling good today. Pt feels the medication change helped him. Pt reports feeling better than when he came in, denied SI/HI/AVH. Pt will return to sober living and will continue to treat with [facility] at time of discharge. Pt cooperative, appropriate, with improved insight/judgement.   
SW called pt's sober living [facility]  (CHARIS signed) and they will send someone to pick the pt up at the main entrance and call the RN station upon arrival.   
SW met with pt to discuss discharge for today. Pt is alert and oriented x4. Pt's mood is neutral, affect is congruent. Pt's speech is clear, rate and volume is normal. Pt's insight and judgement is improved. Pt denied depression and anxiety. Pt denied SI, HI, AVH. Pt reported that he will be discharging to the recovery house and the recovery house or his grandma will be picking him up from the hospital. Pt reported to feeling good today and ready to go home. Pt stated that they are going to get their medications filled at either the [facility] or [place], whichever is easier. Pt will follow up with [facility] for services.     SW called [facility]'s [facility]  (CHARIS signed) to discuss discharge planning. SW was informed that they can be called when the pt is ready to be picked up and they will send transportation. SW advised to go to the main entrance of the hospital.     CHANDRIKA called pt's grandmary Schwartz [phone] (CHARIS signed) to inform her of the discharge. Lana denied any questions or concerns for discharge today. SW informed grandma that the recovery house will be picking the pt up from the hospital.     Pt has follow up appointments scheduled at [facility]. Pt has a counseling appointment on 7/17 and medication management appointment on 7/31 and a case management appointment on 7/15.    In order to ensure appropriate transition and discharge planning is in place, the following documents have been transmitted to [facility], as the new outpatient provider:    The d/c diagnosis was transmitted to the next care provider  The reason for hospitalization was transmitted to the next care provider  The d/c medications (dosage and indication) were transmitted to the next care provider   The continuing care plan was transmitted to the next care provider   
support from his brothers, parents, and grandmother. He states that he is single with no children. Pt states that there is a family hx of mental health, but is unsure of dx. Pt states that his cousin committed suicide 3yrs ago and pt reports being close with this cousin. Pt states that he completed 10th grade and is currently working on [employer]. He states that he is unemployed and receives SSI and food stamps.     Risk Factors: mental health dx  Hx of inpatient psych  Hx of trauma  Substance use hx  Family hx of suicide  Hx of suicide attempts    Protective Factors:  safe and stable housing  Fair communication skills  Goals and hope for the future  Help-seeking behavior  Support system in place  Active with outpatient provider  Medication compliance  Access to essential needs  Steady income    Gender  [x] Male [] Female [] Transgender  [] Other    Sexual Orientation    [x] Heterosexual [] Homosexual [] Bisexual [] Other    Suicidal Ideation  [x] Past [] Present [x] Denies     C-SSRS Screening Completed: Current Suicide Risk:  [] No Risk  [] Low [x] Moderate [] High    Homicidal Ideation  [] Past [] Present [x] Denies     Hallucinations/Delusions (Specify type)  [] Reports [x] Denies     Current or Past Mental Health Treatment:  [x] Yes, When and Where[facility] and per [facility], [facility] admission at [facility]   [] No    Substance Use/Alcohol Use/Addiction  [] Reports [x] Denies sober 1 year     Tobacco Use (within the last 6 months)  [x] Reports [] Denies     Trauma History  [x] Reports [] Denies     Self Injurious/Self Mutilation Behaviors:   [x] Reports:    [x] Past [] Present   [] Denies    Legal History:  [x]  Yes (Specify)    [] No    Collateral Contact (CHARIS signed)  Name: Lana Tay  /  [facility]  Relationship: grandmother  /  Sober Living  Number:   /  [phone]    Collateral Information: SW contacted pt sober living and spoke with Jhonatan Quintero who states that pt is able to return at

## 2024-07-09 NOTE — GROUP NOTE
Group Therapy Note    Date: 7/9/2024    Group Start Time: 0945  Group End Time: 1015  Group Topic: Psychoeducation    [facility]    Eliana Portillo CTRS    Group Therapy Note    Attendees: 16    Date: 7/9/2024  Start Time: 0945  End Time:  1015  Number of Participants: 16    Type of Group: Psychoeducation    Name:  Improving Mood    Patient's Goal:  Identify what is mood, characteristics of positive and negative moods, factors that influence mood and ways to improve mood.    Notes:  CTRS led educational group discussion on improving mood. Encouraged patients to share their experiences. Patient was actively engaged in group discussion and made positive contributions to group discussion.    Status After Intervention:  Improved    Participation Level: Active Listener and Interactive    Participation Quality: Appropriate, Attentive, and Sharing      Speech:  normal      Thought Process/Content: Logical  Linear      Affective Functioning: Congruent      Mood:  Appropriate      Level of consciousness:  Alert and Attentive      Response to Learning: Able to verbalize current knowledge/experience, Able to verbalize/acknowledge new learning, Able to retain information, Capable of insight, Able to change behavior, and Progressing to goal      Endings: None Reported    Modes of Intervention: Education, Support, Socialization, Exploration, Clarifying, and Problem-solving      Discipline Responsible: Psychoeducational Specialist      Signature:  MIGUEL ANGEL Beltran

## 2024-07-09 NOTE — GROUP NOTE
Group Therapy Note    Date: 7/9/2024    Group Start Time: 1045  Group End Time: 1120  Group Topic: Cognitive Skills    [facility]    Lilliam Lion MSW, LSW        Group Therapy Note    Attendees: 14       Patient's Goal:  Pt will be able to identify escalating behaviors and the damage they do on the relationships and the skills that can be used to prevent the negative behaviors.     Notes:  Pt participated in group and made connections.     Status After Intervention:  Improved    Participation Level: Active Listener and Interactive    Participation Quality: Appropriate, Attentive, Sharing, and Supportive      Speech:  normal      Thought Process/Content: Logical  Linear      Affective Functioning: Congruent      Mood: anxious      Level of consciousness:  Alert, Oriented x4, and Attentive      Response to Learning: Able to verbalize current knowledge/experience, Able to verbalize/acknowledge new learning, Able to retain information, and Capable of insight      Endings: None Reported    Modes of Intervention: Education, Support, Socialization, Exploration, Clarifying, and Problem-solving      Discipline Responsible: /Counselor      Signature:  TEZ Tom LSW

## 2024-07-09 NOTE — DISCHARGE SUMMARY
DISCHARGE SUMMARY      Patient ID:  Best Taylor  [id number]  24 y.o.  2000    Admit date: 7/4/2024    Discharge date and time: 7/9/2024    Admitting Physician: Wang Amaral MD     Discharge Physician: Dr Munir MURCIA    Discharge Diagnoses:   Patient Active Problem List   Diagnosis    Bipolar affective disorder, mixed, severe, with psychotic behavior (HCC)    Cluster B personality disorder (HCC)       Admission Condition: poor    Discharged Condition: stable    Admission Circumstance: Best Taylor is a 24 year old male with a past psychiatric history of anxiety, bipolar disorder, ADHD, past meth addiction, who presented to the ED with suicidal ideations. Patient was placed on an involuntary hold by ED physician. Precipitating factors include life stressors. Onset of acute symptoms, worsening for 2 weeks.       PAST MEDICAL/PSYCHIATRIC HISTORY:   Past Medical History:   Diagnosis Date    Methamphetamine abuse (HCC)     Jenaro-Parkinson-White syndrome        FAMILY/SOCIAL HISTORY:  History reviewed. No pertinent family history.  Social History     Socioeconomic History    Marital status: Single     Spouse name: Not on file    Number of children: Not on file    Years of education: Not on file    Highest education level: Not on file   Occupational History    Not on file   Tobacco Use    Smoking status: Former     Types: E-Cigarettes    Smokeless tobacco: Never   Substance and Sexual Activity    Alcohol use: Not Currently    Drug use: Not Currently     Types: Methamphetamines (Crystal Meth)    Sexual activity: Not on file   Other Topics Concern    Not on file   Social History Narrative    Not on file     Social Determinants of Health     Financial Resource Strain: Not on file   Food Insecurity: No Food Insecurity (7/5/2024)    Hunger Vital Sign     Worried About Running Out of Food in the Last Year: Never true     Ran Out of Food in the Last Year: Never true   Transportation Needs: No Transportation

## 2024-07-09 NOTE — GROUP NOTE
Group Therapy Note    Date: 7/9/2024    Group Start Time: 0930  Group End Time: 0945  Group Topic: Community Meeting    [facility]    Eliana Portillo CTRS    Group Therapy Note    Attendees: 16    Date: 7/9/2024  Start Time: 0930  End Time:  0945  Number of Participants: 16    Type of Group: Community Meeting    Was updated on expectations of the unit, staffing, and programming.  Patient shared goal for today as \"Stay focused.\"    Status After Intervention:  Improved    Participation Level: Active Listener and Interactive    Participation Quality: Appropriate, Attentive, and Sharing      Speech:  normal      Thought Process/Content: Logical  Linear      Affective Functioning: Congruent      Mood:  Appropriate      Level of consciousness:  Alert and Attentive      Response to Learning: Able to verbalize current knowledge/experience, Able to verbalize/acknowledge new learning, Able to retain information, Capable of insight, Able to change behavior, and Progressing to goal      Endings: None Reported    Modes of Intervention: Education, Support, Socialization, Exploration, Clarifying, and Problem-solving      Discipline Responsible: Psychoeducational Specialist      Signature:  MIGUEL ANGEL Beltran

## 2024-07-09 NOTE — BH NOTE
4 Eyes Skin Assessment     NAME:  Best Taylor  YOB: 2000  MEDICAL RECORD NUMBER:  [id number]    The patient is being assessed for  Admission    I agree that at least one RN has performed a thorough Head to Toe Skin Assessment on the patient. ALL assessment sites listed below have been assessed.      Areas assessed by both nurses:    Head, Face, Ears, Shoulders, Back, Chest, Arms, Elbows, Hands, Sacrum. Buttock, Coccyx, Ischium, and Legs. Feet and Heels        Does the Patient have a Wound? No noted wound(s)       Jonn Prevention initiated by RN: Yes  Wound Care Orders initiated by RN: No    Pressure Injury (Stage 3,4, Unstageable, DTI, NWPT, and Complex wounds) if present, place Wound referral order by RN under : No    New Ostomies, if present place, Ostomy referral order under : No     Nurse 1 eSignature: Electronically signed by Kaylen Wells RN on 7/5/24 at 5:14 AM EDT    **SHARE this note so that the co-signing nurse can place an eSignature**    Nurse 2 eSignature: Electronically signed by Megan Dover RN on 7/5/24 at 5:15 AM EDT   
[facility]  Initial Interdisciplinary Treatment Plan NOTE    Review Date & Time: 7/5/24 @ 0900    Patient was in treatment team    Admission Type:   Admission Type: Involuntary    Reason for admission:  Reason for Admission: \"Having suicidal thoughts my whole life\"      Estimated Length of Stay Update:  1-7 days  Estimated Discharge Date Update: 7/7/24    EDUCATION:   Learner Progress Toward Treatment Goals: Reviewed results and recommendations of this team, Reviewed group plan and strategies, Reviewed signs, symptoms and risk of self harm and violent behavior, and Reviewed goals and plan of care    Method: Small group    Outcome: Verbalized understanding    PATIENT GOALS: Take medications as prescribed, attend group sessions    PLAN/TREATMENT RECOMMENDATIONS UPDATE:7/7/24    GOALS UPDATE:   Time frame for Short-Term Goals: 2 days    Henny Fernandez RN      
[facility]  Day 3 Interdisciplinary Treatment Plan NOTE    Review Date & Time: 7/8/24 @ 0900    Patient was in treatment team    Estimated Length of Stay Update:  7/10/24  Estimated Discharge Date Update: 7/10/24    EDUCATION:   Learner Progress Toward Treatment Goals: Reviewed results and recommendations of this team, Reviewed group plan and strategies, Reviewed signs, symptoms and risk of self harm and violent behavior, and Reviewed goals and plan of care    Method: Small group    Outcome: Verbalized understanding    PATIENT GOALS: Take medications as prescribed, attend group sessions    PLAN/TREATMENT RECOMMENDATIONS UPDATE:7/10/24    GOALS UPDATE:   Time frame for Short-Term Goals: 3 days      Henny Fernandez RN  
OQ Analyst completed and menu faxed to dietary.   
Patient resting with eyes closed. Respirations even and unlabored. No signs of distress. Purposeful rounding continued.   
Fair  Observed Behavior: Cooperative, Withdrawn  Sexual Misconduct History: Current - no  Preception: [place] to person, [place] to time, [place] to place, [place] to situation  Attention:Normal: Yes  Thought Processes: Other (comment) (Impaired)  Thought Content:Normal: No  Thought Content: Poverty of content  Depression Symptoms: Change in energy level, Feelings of hopelessess, Feelings of worthlessness, Isolative  Anxiety Symptoms: Generalized  Nelly Symptoms: No problems reported or observed.  Hallucinations: Visual (comment) (\"Things\" in his peripheral vision.)  Delusions: No  Memory:Normal: No  Memory: Poor recent  Insight and Judgment: No  Insight and Judgment: Poor judgment, Poor insight    Tobacco Screening:  Practical Counseling, on admission, alexey X, if applicable and completed (first 3 are required if patient doesn't refuse):            ( ) Recognizing danger situations (included triggers and roadblocks)                    ( ) Coping skills (new ways to manage stress,relaxation techniques, changing routine, distraction)                                                           ( ) Basic information about quitting (benefits of quitting, techniques in how to quit, available resources  ( ) [facility]                                                                                                                   (x) Patient refused counseling  ( ) Patient has not smoked in the last 30 days    Metabolic Screening:    No results found for: \"LABA1C\"    No results found for: \"CHOL\"  No results found for: \"TRIG\"  No results found for: \"HDL\"  No components found for: \"LDLCAL\"  No components found for: \"LABVLDL\"      Body mass index is 23.81 kg/m².    BP Readings from Last 2 Encounters:   07/05/24 119/77   12/18/22 128/68       Recliner Assessment:  Patient is able to demonstrate the ability to move from a reclining position to an upright position within the

## 2024-07-09 NOTE — TRANSITION OF CARE
[facility] Transition Record    Patient Name: Best Taylor  YOB: 2000   Medical Record Number: [id number]  Date of Admission: 7/4/2024  5:33 PM   Date of Discharge:  7/9/24    Attending Provider: Wang Amaral MD   Discharging Provider:  MD MERISSA  To contact this individual call  [phone] and ask the  to page.  If unavailable, ask to be transferred to Behavioral Health Provider on call.  A Behavioral Health Provider will be available on call 24/7 and during holidays.    Primary Care Provider: No primary care provider on file.    Allergies   Allergen Reactions    Benadryl [Diphenhydramine]        Reason for Admission:   CIRCUMSTANCES OF ADMISSION: Best Taylor is a 24 year old male with a past psychiatric history of anxiety, bipolar disorder, ADHD, past meth addiction, who presented to the ED with suicidal ideations. Patient was placed on an involuntary hold by ED physician. Precipitating factors include life stressors. Onset of acute symptoms, worsening for 2 weeks.     Admission Diagnosis: Suicidal ideation [R45.851]  Bipolar 1 disorder (HCC) [F31.9]    * No surgery found *    Results for orders placed or performed during the hospital encounter of 07/04/24   CBC with Auto Differential   Result Value Ref Range    WBC 9.9 4.5 - 11.5 k/uL    RBC 5.73 3.80 - 5.80 m/uL    Hemoglobin 16.2 12.5 - 16.5 g/dL    Hematocrit 46.1 37.0 - 54.0 %    MCV 80.5 80.0 - 99.9 fL    MCH 28.3 26.0 - 35.0 pg    MCHC 35.1 (H) 32.0 - 34.5 g/dL    RDW 12.9 11.5 - 15.0 %    Platelets 384 130 - 450 k/uL    MPV 9.3 7.0 - 12.0 fL    Neutrophils % 55 43.0 - 80.0 %    Lymphocytes % 31 20.0 - 42.0 %    Monocytes % 12 2.0 - 12.0 %    Eosinophils % 2 0 - 6 %    Basophils % 1 0.0 - 2.0 %    Immature Granulocytes % 0 0.0 - 5.0 %    Neutrophils Absolute 5.39 1.80 - 7.30 k/uL    Lymphocytes Absolute 3.06 1.50 - 4.00 k/uL    Monocytes Absolute 1.17 (H) 0.10 - 0.95 k/uL    Eosinophils Absolute 0.15 0.05

## 2024-07-09 NOTE — PLAN OF CARE
Problem: Depression  Goal: Will be euthymic at discharge  Description: INTERVENTIONS:  1. Administer medication as ordered  2. Provide emotional support via 1:1 interaction with staff  3. Encourage involvement in milieu/groups/activities  4. Monitor for social isolation  7/8/2024 2200 by Mahi Hobson RN  Outcome: Progressing     Problem: Anxiety  Goal: Will report anxiety at manageable levels  Description: INTERVENTIONS:  1. Administer medication as ordered  2. Teach and rehearse alternative coping skills  3. Provide emotional support with 1:1 interaction with staff  7/8/2024 2200 by Mahi Hobson RN  Outcome: Progressing   Patient denies suicidal/homicidal ideations and hallucinations. Patient denies depression, reports feeling a little anxious about being discharged tomorrow but otherwise feel good and had a good day. Patient is taking prescribed medications without issue. Continued support offered to patient.

## 2025-09-03 ENCOUNTER — HOSPITAL ENCOUNTER (EMERGENCY)
Facility: HOSPITAL | Age: 25
Discharge: HOME | End: 2025-09-04
Attending: EMERGENCY MEDICINE
Payer: MEDICAID

## 2025-09-03 DIAGNOSIS — F19.10 POLYSUBSTANCE ABUSE: Primary | ICD-10-CM

## 2025-09-03 PROCEDURE — 99284 EMERGENCY DEPT VISIT MOD MDM: CPT | Performed by: EMERGENCY MEDICINE

## 2025-09-03 ASSESSMENT — PAIN - FUNCTIONAL ASSESSMENT: PAIN_FUNCTIONAL_ASSESSMENT: 0-10

## 2025-09-03 ASSESSMENT — PAIN DESCRIPTION - LOCATION: LOCATION: NECK

## 2025-09-03 ASSESSMENT — PAIN SCALES - GENERAL: PAINLEVEL_OUTOF10: 8

## 2025-09-04 ENCOUNTER — APPOINTMENT (OUTPATIENT)
Dept: CARDIOLOGY | Facility: HOSPITAL | Age: 25
End: 2025-09-04
Payer: MEDICAID

## 2025-09-04 VITALS
OXYGEN SATURATION: 99 % | SYSTOLIC BLOOD PRESSURE: 111 MMHG | BODY MASS INDEX: 20.38 KG/M2 | RESPIRATION RATE: 16 BRPM | DIASTOLIC BLOOD PRESSURE: 61 MMHG | HEART RATE: 68 BPM | TEMPERATURE: 97.7 F | HEIGHT: 69 IN | WEIGHT: 137.57 LBS

## 2025-09-04 LAB
ALBUMIN SERPL BCP-MCNC: 4.5 G/DL (ref 3.4–5)
ALP SERPL-CCNC: 85 U/L (ref 33–120)
ALT SERPL W P-5'-P-CCNC: 22 U/L (ref 10–52)
ANION GAP SERPL CALC-SCNC: 14 MMOL/L (ref 10–20)
AST SERPL W P-5'-P-CCNC: 23 U/L (ref 9–39)
BASOPHILS # BLD AUTO: 0.09 X10*3/UL (ref 0–0.1)
BASOPHILS NFR BLD AUTO: 0.8 %
BILIRUB SERPL-MCNC: 0.4 MG/DL (ref 0–1.2)
BUN SERPL-MCNC: 23 MG/DL (ref 6–23)
CALCIUM SERPL-MCNC: 9.5 MG/DL (ref 8.6–10.3)
CHLORIDE SERPL-SCNC: 103 MMOL/L (ref 98–107)
CO2 SERPL-SCNC: 25 MMOL/L (ref 21–32)
CREAT SERPL-MCNC: 1.01 MG/DL (ref 0.5–1.3)
EGFRCR SERPLBLD CKD-EPI 2021: >90 ML/MIN/1.73M*2
EOSINOPHIL # BLD AUTO: 0.29 X10*3/UL (ref 0–0.7)
EOSINOPHIL NFR BLD AUTO: 2.7 %
ERYTHROCYTE [DISTWIDTH] IN BLOOD BY AUTOMATED COUNT: 13.7 % (ref 11.5–14.5)
GLUCOSE SERPL-MCNC: 142 MG/DL (ref 74–99)
HCT VFR BLD AUTO: 42.3 % (ref 41–52)
HGB BLD-MCNC: 14.5 G/DL (ref 13.5–17.5)
IMM GRANULOCYTES # BLD AUTO: 0.06 X10*3/UL (ref 0–0.7)
IMM GRANULOCYTES NFR BLD AUTO: 0.6 % (ref 0–0.9)
LYMPHOCYTES # BLD AUTO: 3.78 X10*3/UL (ref 1.2–4.8)
LYMPHOCYTES NFR BLD AUTO: 34.7 %
MCH RBC QN AUTO: 29.8 PG (ref 26–34)
MCHC RBC AUTO-ENTMCNC: 34.3 G/DL (ref 32–36)
MCV RBC AUTO: 87 FL (ref 80–100)
MONOCYTES # BLD AUTO: 0.95 X10*3/UL (ref 0.1–1)
MONOCYTES NFR BLD AUTO: 8.7 %
NEUTROPHILS # BLD AUTO: 5.71 X10*3/UL (ref 1.2–7.7)
NEUTROPHILS NFR BLD AUTO: 52.5 %
NRBC BLD-RTO: 0 /100 WBCS (ref 0–0)
PLATELET # BLD AUTO: 319 X10*3/UL (ref 150–450)
POTASSIUM SERPL-SCNC: 3.9 MMOL/L (ref 3.5–5.3)
PROT SERPL-MCNC: 7.2 G/DL (ref 6.4–8.2)
RBC # BLD AUTO: 4.86 X10*6/UL (ref 4.5–5.9)
SODIUM SERPL-SCNC: 138 MMOL/L (ref 136–145)
WBC # BLD AUTO: 10.9 X10*3/UL (ref 4.4–11.3)

## 2025-09-04 PROCEDURE — 93005 ELECTROCARDIOGRAM TRACING: CPT

## 2025-09-04 PROCEDURE — 85025 COMPLETE CBC W/AUTO DIFF WBC: CPT | Performed by: EMERGENCY MEDICINE

## 2025-09-04 PROCEDURE — 2500000004 HC RX 250 GENERAL PHARMACY W/ HCPCS (ALT 636 FOR OP/ED): Performed by: EMERGENCY MEDICINE

## 2025-09-04 PROCEDURE — 80053 COMPREHEN METABOLIC PANEL: CPT | Performed by: EMERGENCY MEDICINE

## 2025-09-04 PROCEDURE — 36415 COLL VENOUS BLD VENIPUNCTURE: CPT | Performed by: EMERGENCY MEDICINE

## 2025-09-04 RX ADMIN — SODIUM CHLORIDE 1000 ML: 9 INJECTION, SOLUTION INTRAVENOUS at 00:15

## 2025-09-05 LAB
ATRIAL RATE: 83 BPM
P AXIS: 60 DEGREES
P OFFSET: 152 MS
P ONSET: 116 MS
PR INTERVAL: 202 MS
Q ONSET: 217 MS
QRS COUNT: 13 BEATS
QRS DURATION: 90 MS
QT INTERVAL: 344 MS
QTC CALCULATION(BAZETT): 404 MS
QTC FREDERICIA: 383 MS
R AXIS: 90 DEGREES
T AXIS: 80 DEGREES
T OFFSET: 389 MS
VENTRICULAR RATE: 83 BPM